# Patient Record
Sex: MALE | Race: BLACK OR AFRICAN AMERICAN | NOT HISPANIC OR LATINO | RURAL
[De-identification: names, ages, dates, MRNs, and addresses within clinical notes are randomized per-mention and may not be internally consistent; named-entity substitution may affect disease eponyms.]

---

## 2021-05-20 ENCOUNTER — CLINICAL SUPPORT (OUTPATIENT)
Dept: PRIMARY CARE CLINIC | Facility: CLINIC | Age: 61
End: 2021-05-20
Payer: MEDICAID

## 2021-05-20 VITALS
WEIGHT: 172 LBS | TEMPERATURE: 98 F | HEIGHT: 69 IN | RESPIRATION RATE: 17 BRPM | OXYGEN SATURATION: 98 % | DIASTOLIC BLOOD PRESSURE: 62 MMHG | HEART RATE: 82 BPM | SYSTOLIC BLOOD PRESSURE: 120 MMHG | BODY MASS INDEX: 25.48 KG/M2

## 2021-05-20 DIAGNOSIS — F20.9 SCHIZOPHRENIA, UNSPECIFIED TYPE: Primary | ICD-10-CM

## 2021-05-20 PROCEDURE — 99213 PR OFFICE/OUTPT VISIT, EST, LEVL III, 20-29 MIN: ICD-10-PCS | Mod: ,,, | Performed by: FAMILY MEDICINE

## 2021-05-20 PROCEDURE — 99213 OFFICE O/P EST LOW 20 MIN: CPT | Mod: ,,, | Performed by: FAMILY MEDICINE

## 2021-05-20 RX ORDER — HYDROXYZINE HYDROCHLORIDE 50 MG/1
50 TABLET, FILM COATED ORAL EVERY 8 HOURS
Qty: 30 TABLET | Refills: 11 | Status: SHIPPED | OUTPATIENT
Start: 2021-05-20 | End: 2022-03-10 | Stop reason: SDUPTHER

## 2021-05-20 RX ORDER — RISPERIDONE 2 MG/1
2 TABLET ORAL 2 TIMES DAILY
Qty: 60 TABLET | Refills: 11 | Status: SHIPPED | OUTPATIENT
Start: 2021-05-20 | End: 2022-03-10 | Stop reason: SDUPTHER

## 2021-05-20 RX ORDER — TRAZODONE HYDROCHLORIDE 50 MG/1
50 TABLET ORAL NIGHTLY
Qty: 30 TABLET | Refills: 11 | Status: SHIPPED | OUTPATIENT
Start: 2021-05-20 | End: 2022-03-10 | Stop reason: SDUPTHER

## 2021-05-20 RX ORDER — NITROGLYCERIN 0.4 MG/1
0.4 TABLET SUBLINGUAL EVERY 5 MIN PRN
COMMUNITY
End: 2021-05-20 | Stop reason: SDUPTHER

## 2021-05-20 RX ORDER — HYDROXYZINE HYDROCHLORIDE 50 MG/1
50 TABLET, FILM COATED ORAL EVERY 8 HOURS
COMMUNITY
End: 2021-05-20 | Stop reason: SDUPTHER

## 2021-05-20 RX ORDER — TRAZODONE HYDROCHLORIDE 50 MG/1
TABLET ORAL
COMMUNITY
Start: 2021-02-02 | End: 2021-05-20 | Stop reason: SDUPTHER

## 2021-05-20 RX ORDER — PALIPERIDONE PALMITATE 234 MG/1.5ML
234 INJECTION INTRAMUSCULAR ONCE
Qty: 1.5 ML | Refills: 5 | Status: SHIPPED | OUTPATIENT
Start: 2021-05-20 | End: 2022-03-10

## 2021-05-20 RX ORDER — DIVALPROEX SODIUM 500 MG/1
TABLET, FILM COATED, EXTENDED RELEASE ORAL
COMMUNITY
Start: 2021-04-21 | End: 2021-05-20 | Stop reason: SDUPTHER

## 2021-05-20 RX ORDER — BENZTROPINE MESYLATE 1 MG/1
1 TABLET ORAL 2 TIMES DAILY
Qty: 60 TABLET | Refills: 11 | Status: SHIPPED | OUTPATIENT
Start: 2021-05-20 | End: 2022-10-10 | Stop reason: SDUPTHER

## 2021-05-20 RX ORDER — NITROGLYCERIN 0.4 MG/1
0.4 TABLET SUBLINGUAL EVERY 5 MIN PRN
Qty: 25 TABLET | Refills: 5 | Status: SHIPPED | OUTPATIENT
Start: 2021-05-20 | End: 2022-10-10 | Stop reason: ALTCHOICE

## 2021-05-20 RX ORDER — POTASSIUM CHLORIDE 750 MG/1
10 CAPSULE, EXTENDED RELEASE ORAL ONCE
COMMUNITY
End: 2021-05-20 | Stop reason: SDUPTHER

## 2021-05-20 RX ORDER — BENZTROPINE MESYLATE 1 MG/1
TABLET ORAL
COMMUNITY
Start: 2021-04-21 | End: 2021-05-20 | Stop reason: SDUPTHER

## 2021-05-20 RX ORDER — ASPIRIN 81 MG/1
81 TABLET ORAL DAILY
Qty: 30 TABLET | Refills: 11 | Status: SHIPPED | OUTPATIENT
Start: 2021-05-20 | End: 2022-03-09 | Stop reason: SDUPTHER

## 2021-05-20 RX ORDER — PALIPERIDONE PALMITATE 234 MG/1.5ML
INJECTION INTRAMUSCULAR
COMMUNITY
Start: 2021-05-19 | End: 2021-05-20 | Stop reason: SDUPTHER

## 2021-05-20 RX ORDER — RISPERIDONE 2 MG/1
2 TABLET ORAL 2 TIMES DAILY
COMMUNITY
End: 2021-05-20 | Stop reason: SDUPTHER

## 2021-05-20 RX ORDER — DIVALPROEX SODIUM 500 MG/1
500 TABLET, FILM COATED, EXTENDED RELEASE ORAL NIGHTLY
Qty: 30 TABLET | Refills: 11 | Status: SHIPPED | OUTPATIENT
Start: 2021-05-20 | End: 2022-10-10 | Stop reason: SDUPTHER

## 2021-05-20 RX ORDER — POTASSIUM CHLORIDE 750 MG/1
10 CAPSULE, EXTENDED RELEASE ORAL ONCE
Qty: 30 CAPSULE | Refills: 11 | Status: SHIPPED | OUTPATIENT
Start: 2021-05-20 | End: 2021-05-20

## 2021-05-20 RX ORDER — ASPIRIN 81 MG/1
81 TABLET ORAL DAILY
COMMUNITY
End: 2021-05-20 | Stop reason: SDUPTHER

## 2021-06-03 RX ORDER — PALIPERIDONE PALMITATE 234 MG/1.5ML
234 INJECTION INTRAMUSCULAR ONCE
Qty: 1.5 ML | Refills: 0 | OUTPATIENT
Start: 2021-06-03 | End: 2021-06-03

## 2021-06-28 ENCOUNTER — CLINICAL SUPPORT (OUTPATIENT)
Dept: PRIMARY CARE CLINIC | Facility: CLINIC | Age: 61
End: 2021-06-28
Payer: MEDICAID

## 2021-06-28 DIAGNOSIS — F20.9 SCHIZOPHRENIA, UNSPECIFIED TYPE: Primary | ICD-10-CM

## 2022-03-09 ENCOUNTER — APPOINTMENT (OUTPATIENT)
Dept: RADIOLOGY | Facility: CLINIC | Age: 62
End: 2022-03-09
Attending: NURSE PRACTITIONER
Payer: MEDICAID

## 2022-03-09 ENCOUNTER — OFFICE VISIT (OUTPATIENT)
Dept: PRIMARY CARE CLINIC | Facility: CLINIC | Age: 62
End: 2022-03-09
Payer: MEDICAID

## 2022-03-09 VITALS
OXYGEN SATURATION: 96 % | BODY MASS INDEX: 23.99 KG/M2 | HEIGHT: 69 IN | TEMPERATURE: 98 F | RESPIRATION RATE: 18 BRPM | DIASTOLIC BLOOD PRESSURE: 78 MMHG | HEART RATE: 99 BPM | WEIGHT: 162 LBS | SYSTOLIC BLOOD PRESSURE: 120 MMHG

## 2022-03-09 DIAGNOSIS — R10.9 ABDOMINAL PAIN, UNSPECIFIED ABDOMINAL LOCATION: ICD-10-CM

## 2022-03-09 DIAGNOSIS — I25.10 CARDIOVASCULAR DISEASE: ICD-10-CM

## 2022-03-09 DIAGNOSIS — F20.9 SCHIZOPHRENIA, UNSPECIFIED TYPE: ICD-10-CM

## 2022-03-09 DIAGNOSIS — R10.9 ABDOMINAL PAIN, UNSPECIFIED ABDOMINAL LOCATION: Primary | ICD-10-CM

## 2022-03-09 PROCEDURE — 3078F DIAST BP <80 MM HG: CPT | Mod: ,,, | Performed by: NURSE PRACTITIONER

## 2022-03-09 PROCEDURE — 99213 OFFICE O/P EST LOW 20 MIN: CPT | Mod: ,,, | Performed by: NURSE PRACTITIONER

## 2022-03-09 PROCEDURE — 74018 RADEX ABDOMEN 1 VIEW: CPT | Mod: TC,RHCUB | Performed by: NURSE PRACTITIONER

## 2022-03-09 PROCEDURE — 3074F PR MOST RECENT SYSTOLIC BLOOD PRESSURE < 130 MM HG: ICD-10-PCS | Mod: ,,, | Performed by: NURSE PRACTITIONER

## 2022-03-09 PROCEDURE — 3074F SYST BP LT 130 MM HG: CPT | Mod: ,,, | Performed by: NURSE PRACTITIONER

## 2022-03-09 PROCEDURE — 74018 XR KUB: ICD-10-PCS | Mod: 26,,, | Performed by: RADIOLOGY

## 2022-03-09 PROCEDURE — 3078F PR MOST RECENT DIASTOLIC BLOOD PRESSURE < 80 MM HG: ICD-10-PCS | Mod: ,,, | Performed by: NURSE PRACTITIONER

## 2022-03-09 PROCEDURE — 99213 PR OFFICE/OUTPT VISIT, EST, LEVL III, 20-29 MIN: ICD-10-PCS | Mod: ,,, | Performed by: NURSE PRACTITIONER

## 2022-03-09 PROCEDURE — 74018 RADEX ABDOMEN 1 VIEW: CPT | Mod: 26,,, | Performed by: RADIOLOGY

## 2022-03-09 PROCEDURE — 3008F PR BODY MASS INDEX (BMI) DOCUMENTED: ICD-10-PCS | Mod: ,,, | Performed by: NURSE PRACTITIONER

## 2022-03-09 PROCEDURE — 3008F BODY MASS INDEX DOCD: CPT | Mod: ,,, | Performed by: NURSE PRACTITIONER

## 2022-03-09 RX ORDER — ASPIRIN 81 MG/1
81 TABLET ORAL DAILY
Qty: 90 TABLET | Refills: 2 | Status: SHIPPED | OUTPATIENT
Start: 2022-03-09 | End: 2022-10-10 | Stop reason: ALTCHOICE

## 2022-03-09 RX ORDER — DIVALPROEX SODIUM 500 MG/1
500 TABLET, FILM COATED, EXTENDED RELEASE ORAL NIGHTLY
Qty: 30 TABLET | Refills: 11 | Status: CANCELLED | OUTPATIENT
Start: 2022-03-09

## 2022-03-09 RX ORDER — RISPERIDONE 2 MG/1
2 TABLET ORAL 2 TIMES DAILY
Qty: 60 TABLET | Refills: 11 | Status: CANCELLED | OUTPATIENT
Start: 2022-03-09

## 2022-03-09 RX ORDER — TRAZODONE HYDROCHLORIDE 50 MG/1
50 TABLET ORAL NIGHTLY
Qty: 30 TABLET | Refills: 11 | Status: CANCELLED | OUTPATIENT
Start: 2022-03-09

## 2022-03-09 RX ORDER — HYDROXYZINE HYDROCHLORIDE 50 MG/1
50 TABLET, FILM COATED ORAL EVERY 8 HOURS
Qty: 30 TABLET | Refills: 11 | Status: CANCELLED | OUTPATIENT
Start: 2022-03-09
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
## 2022-03-10 ENCOUNTER — TELEPHONE (OUTPATIENT)
Dept: PRIMARY CARE CLINIC | Facility: CLINIC | Age: 62
End: 2022-03-10
Payer: MEDICAID

## 2022-03-10 DIAGNOSIS — F20.9 SCHIZOPHRENIA, UNSPECIFIED TYPE: ICD-10-CM

## 2022-03-10 RX ORDER — HYDROXYZINE HYDROCHLORIDE 50 MG/1
50 TABLET, FILM COATED ORAL EVERY 8 HOURS
Qty: 30 TABLET | Refills: 5 | Status: SHIPPED | OUTPATIENT
Start: 2022-03-10 | End: 2022-10-10 | Stop reason: SDUPTHER

## 2022-03-10 RX ORDER — RISPERIDONE 2 MG/1
2 TABLET ORAL 2 TIMES DAILY
Qty: 60 TABLET | Refills: 5 | Status: SHIPPED | OUTPATIENT
Start: 2022-03-10 | End: 2022-10-10

## 2022-03-10 RX ORDER — TRAZODONE HYDROCHLORIDE 50 MG/1
50 TABLET ORAL NIGHTLY
Qty: 30 TABLET | Refills: 5 | Status: SHIPPED | OUTPATIENT
Start: 2022-03-10 | End: 2022-10-10 | Stop reason: SDUPTHER

## 2022-03-10 NOTE — TELEPHONE ENCOUNTER
----- Message from Rudy Escamilla DNP, ANN-C sent at 3/9/2022  5:07 PM CST -----  Please notify patient/his guardian of results

## 2022-05-20 ENCOUNTER — HOSPITAL ENCOUNTER (EMERGENCY)
Facility: HOSPITAL | Age: 62
Discharge: HOME | End: 2022-05-20
Attending: SPECIALIST
Payer: MEDICAID

## 2022-05-20 VITALS
OXYGEN SATURATION: 100 % | SYSTOLIC BLOOD PRESSURE: 138 MMHG | DIASTOLIC BLOOD PRESSURE: 75 MMHG | HEART RATE: 91 BPM | HEIGHT: 69 IN | WEIGHT: 150.63 LBS | BODY MASS INDEX: 22.31 KG/M2 | RESPIRATION RATE: 16 BRPM | TEMPERATURE: 99 F

## 2022-05-20 DIAGNOSIS — R07.9 CHEST PAIN: ICD-10-CM

## 2022-05-20 DIAGNOSIS — R07.9 CHEST PAIN, UNSPECIFIED TYPE: Primary | ICD-10-CM

## 2022-05-20 PROCEDURE — 25000003 PHARM REV CODE 250: Performed by: SPECIALIST

## 2022-05-20 PROCEDURE — 93005 ELECTROCARDIOGRAM TRACING: CPT

## 2022-05-20 PROCEDURE — 99283 PR EMERGENCY DEPT VISIT,LEVEL III: ICD-10-PCS | Mod: ,,, | Performed by: SPECIALIST

## 2022-05-20 PROCEDURE — 93010 EKG 12-LEAD: ICD-10-PCS | Mod: ,,, | Performed by: INTERNAL MEDICINE

## 2022-05-20 PROCEDURE — 99283 EMERGENCY DEPT VISIT LOW MDM: CPT | Mod: 25 | Performed by: SPECIALIST

## 2022-05-20 PROCEDURE — 93010 ELECTROCARDIOGRAM REPORT: CPT | Mod: ,,, | Performed by: INTERNAL MEDICINE

## 2022-05-20 PROCEDURE — 99283 EMERGENCY DEPT VISIT LOW MDM: CPT | Mod: ,,, | Performed by: SPECIALIST

## 2022-05-20 RX ORDER — ASPIRIN 325 MG
325 TABLET ORAL
Status: COMPLETED | OUTPATIENT
Start: 2022-05-20 | End: 2022-05-20

## 2022-05-20 RX ADMIN — ASPIRIN 325 MG ORAL TABLET 325 MG: 325 PILL ORAL at 08:05

## 2022-05-20 NOTE — ED NOTES
ATTEMPTED IV ACCESS X 2 WITH VEIN BLOWING EACH TIME; LAB ATTEMPTED X 1 WITH NO SUCCESS WITH PATIENT REFUSING ANY MORE ATTEMPTS & STATES HE JUST WANTS TO LEAVE
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
## 2022-05-20 NOTE — ED TRIAGE NOTES
PATIENT STATES HE HAS NOT TAKEN ANY OF HIS MEDICATIONS & DOES NOT KNOW WHEN THE LAST TIME HE HAS TAKEN THEM; PATIENT IS POOR HISTORIAN

## 2022-05-20 NOTE — LETTER
Patient: Dion Flores  YOB: 1960  Date: 5/20/2022 Time: 9:04 AM  Location: [facility] Emergency Department    Leaving the Hospital Against Medical Advice    Chart #:[id number]    This will certify that I, the undersigned,    ______________________________________________________________________    A patient in the above named medical center, having requested discharge and removal from the medical [facility] against the advice of my attending physician(s), hereby release [facility], its physicians, officers and employees, severally and individually, from any and all liability of any nature whatsoever for any injury or harm or complication of any kind that may result directly or indirectly, by reason of my terminating my stay as a patient at [facility] Emergency Department and my departure from [facility], and hereby waive any and all rights of action I may now have or later acquire as a result of my voluntary departure from [facility] and the termination of my stay as a patient therein.    This release is made with the full knowledge of the danger that may result from the action which I am taking.      Date:_______________________                         ___________________________                                                                                    Patient/Legal Representative    Witness:        ____________________________                          ___________________________  Nurse                                                                        Physician

## 2022-06-10 RX ORDER — PALIPERIDONE 3 MG/1
3 TABLET, EXTENDED RELEASE ORAL NIGHTLY
COMMUNITY
Start: 2022-04-21 | End: 2022-07-12 | Stop reason: SDUPTHER

## 2022-06-10 RX ORDER — PALIPERIDONE PALMITATE 234 MG/1.5ML
INJECTION INTRAMUSCULAR
COMMUNITY
Start: 2022-04-21 | End: 2022-07-12 | Stop reason: SDUPTHER

## 2022-06-10 RX ORDER — BENZTROPINE MESYLATE 2 MG/1
2 TABLET ORAL 2 TIMES DAILY
COMMUNITY
Start: 2022-04-21 | End: 2023-07-18

## 2022-07-12 DIAGNOSIS — F20.9 SCHIZOPHRENIA, UNSPECIFIED TYPE: Primary | ICD-10-CM

## 2022-07-12 RX ORDER — PALIPERIDONE 3 MG/1
3 TABLET, EXTENDED RELEASE ORAL NIGHTLY
Qty: 30 TABLET | Refills: 5 | Status: SHIPPED | OUTPATIENT
Start: 2022-07-12 | End: 2022-10-10 | Stop reason: SDUPTHER

## 2022-07-12 RX ORDER — PALIPERIDONE PALMITATE 234 MG/1.5ML
234 INJECTION INTRAMUSCULAR ONCE
Qty: 1.5 ML | Refills: 2 | Status: SHIPPED | OUTPATIENT
Start: 2022-07-12 | End: 2022-10-10 | Stop reason: ALTCHOICE

## 2022-10-10 ENCOUNTER — OFFICE VISIT (OUTPATIENT)
Dept: PRIMARY CARE CLINIC | Facility: CLINIC | Age: 62
End: 2022-10-10
Payer: MEDICAID

## 2022-10-10 VITALS
TEMPERATURE: 99 F | DIASTOLIC BLOOD PRESSURE: 60 MMHG | BODY MASS INDEX: 25.62 KG/M2 | WEIGHT: 173 LBS | HEIGHT: 69 IN | RESPIRATION RATE: 18 BRPM | SYSTOLIC BLOOD PRESSURE: 122 MMHG | HEART RATE: 91 BPM | OXYGEN SATURATION: 96 %

## 2022-10-10 DIAGNOSIS — Z79.899 LONG TERM USE OF DRUG: ICD-10-CM

## 2022-10-10 DIAGNOSIS — F20.9 SCHIZOPHRENIA, UNSPECIFIED TYPE: Primary | ICD-10-CM

## 2022-10-10 LAB
ALBUMIN SERPL BCP-MCNC: 3.7 G/DL (ref 3.5–5)
ALBUMIN/GLOB SERPL: 0.9 {RATIO}
ALP SERPL-CCNC: 70 U/L (ref 45–115)
ALT SERPL W P-5'-P-CCNC: 27 U/L (ref 16–61)
ANION GAP SERPL CALCULATED.3IONS-SCNC: 9 MMOL/L (ref 7–16)
AST SERPL W P-5'-P-CCNC: 16 U/L (ref 15–37)
BASOPHILS # BLD AUTO: 0.02 K/UL (ref 0–0.2)
BASOPHILS NFR BLD AUTO: 0.5 % (ref 0–1)
BILIRUB SERPL-MCNC: 0.4 MG/DL (ref ?–1.2)
BUN SERPL-MCNC: 11 MG/DL (ref 7–18)
BUN/CREAT SERPL: 9 (ref 6–20)
CALCIUM SERPL-MCNC: 9.2 MG/DL (ref 8.5–10.1)
CHLORIDE SERPL-SCNC: 106 MMOL/L (ref 98–107)
CHOLEST SERPL-MCNC: 117 MG/DL (ref 0–200)
CHOLEST/HDLC SERPL: 2.4 {RATIO}
CO2 SERPL-SCNC: 27 MMOL/L (ref 21–32)
CREAT SERPL-MCNC: 1.19 MG/DL (ref 0.7–1.3)
DIFFERENTIAL METHOD BLD: ABNORMAL
EGFR (NO RACE VARIABLE) (RUSH/TITUS): 69 ML/MIN/1.73M²
EOSINOPHIL # BLD AUTO: 0.03 K/UL (ref 0–0.5)
EOSINOPHIL NFR BLD AUTO: 0.8 % (ref 1–4)
ERYTHROCYTE [DISTWIDTH] IN BLOOD BY AUTOMATED COUNT: 12 % (ref 11.5–14.5)
GLOBULIN SER-MCNC: 4.1 G/DL (ref 2–4)
GLUCOSE SERPL-MCNC: 95 MG/DL (ref 74–106)
HCT VFR BLD AUTO: 39.3 % (ref 40–54)
HDLC SERPL-MCNC: 49 MG/DL (ref 40–60)
HGB BLD-MCNC: 13.4 G/DL (ref 13.5–18)
IMM GRANULOCYTES # BLD AUTO: 0.01 K/UL (ref 0–0.04)
IMM GRANULOCYTES NFR BLD: 0.3 % (ref 0–0.4)
LDLC SERPL CALC-MCNC: 49 MG/DL
LDLC/HDLC SERPL: 1 {RATIO}
LYMPHOCYTES # BLD AUTO: 1.1 K/UL (ref 1–4.8)
LYMPHOCYTES NFR BLD AUTO: 29.3 % (ref 27–41)
MCH RBC QN AUTO: 33 PG (ref 27–31)
MCHC RBC AUTO-ENTMCNC: 34.1 G/DL (ref 32–36)
MCV RBC AUTO: 96.8 FL (ref 80–96)
MONOCYTES # BLD AUTO: 0.38 K/UL (ref 0–0.8)
MONOCYTES NFR BLD AUTO: 10.1 % (ref 2–6)
MPC BLD CALC-MCNC: 11.3 FL (ref 9.4–12.4)
NEUTROPHILS # BLD AUTO: 2.22 K/UL (ref 1.8–7.7)
NEUTROPHILS NFR BLD AUTO: 59 % (ref 53–65)
NONHDLC SERPL-MCNC: 68 MG/DL
NRBC # BLD AUTO: 0 X10E3/UL
NRBC, AUTO (.00): 0 %
PLATELET # BLD AUTO: 204 K/UL (ref 150–400)
POTASSIUM SERPL-SCNC: 3.5 MMOL/L (ref 3.5–5.1)
PROT SERPL-MCNC: 7.8 G/DL (ref 6.4–8.2)
RBC # BLD AUTO: 4.06 M/UL (ref 4.6–6.2)
SODIUM SERPL-SCNC: 138 MMOL/L (ref 136–145)
TRIGL SERPL-MCNC: 96 MG/DL (ref 35–150)
VLDLC SERPL-MCNC: 19 MG/DL
WBC # BLD AUTO: 3.76 K/UL (ref 4.5–11)

## 2022-10-10 PROCEDURE — 3008F BODY MASS INDEX DOCD: CPT | Mod: ,,, | Performed by: FAMILY MEDICINE

## 2022-10-10 PROCEDURE — 3078F PR MOST RECENT DIASTOLIC BLOOD PRESSURE < 80 MM HG: ICD-10-PCS | Mod: ,,, | Performed by: FAMILY MEDICINE

## 2022-10-10 PROCEDURE — 80061 LIPID PANEL: CPT | Mod: ,,, | Performed by: CLINICAL MEDICAL LABORATORY

## 2022-10-10 PROCEDURE — 80053 COMPREHENSIVE METABOLIC PANEL: ICD-10-PCS | Mod: ,,, | Performed by: CLINICAL MEDICAL LABORATORY

## 2022-10-10 PROCEDURE — 3078F DIAST BP <80 MM HG: CPT | Mod: ,,, | Performed by: FAMILY MEDICINE

## 2022-10-10 PROCEDURE — 80053 COMPREHEN METABOLIC PANEL: CPT | Mod: ,,, | Performed by: CLINICAL MEDICAL LABORATORY

## 2022-10-10 PROCEDURE — 85025 CBC WITH DIFFERENTIAL: ICD-10-PCS | Mod: ,,, | Performed by: CLINICAL MEDICAL LABORATORY

## 2022-10-10 PROCEDURE — 99214 OFFICE O/P EST MOD 30 MIN: CPT | Mod: ,,, | Performed by: FAMILY MEDICINE

## 2022-10-10 PROCEDURE — 99214 PR OFFICE/OUTPT VISIT, EST, LEVL IV, 30-39 MIN: ICD-10-PCS | Mod: ,,, | Performed by: FAMILY MEDICINE

## 2022-10-10 PROCEDURE — 3074F PR MOST RECENT SYSTOLIC BLOOD PRESSURE < 130 MM HG: ICD-10-PCS | Mod: ,,, | Performed by: FAMILY MEDICINE

## 2022-10-10 PROCEDURE — 3008F PR BODY MASS INDEX (BMI) DOCUMENTED: ICD-10-PCS | Mod: ,,, | Performed by: FAMILY MEDICINE

## 2022-10-10 PROCEDURE — 85025 COMPLETE CBC W/AUTO DIFF WBC: CPT | Mod: ,,, | Performed by: CLINICAL MEDICAL LABORATORY

## 2022-10-10 PROCEDURE — 3074F SYST BP LT 130 MM HG: CPT | Mod: ,,, | Performed by: FAMILY MEDICINE

## 2022-10-10 PROCEDURE — 80061 LIPID PANEL: ICD-10-PCS | Mod: ,,, | Performed by: CLINICAL MEDICAL LABORATORY

## 2022-10-10 RX ORDER — DIVALPROEX SODIUM 500 MG/1
500 TABLET, FILM COATED, EXTENDED RELEASE ORAL NIGHTLY
Qty: 30 TABLET | Refills: 11 | Status: SHIPPED | OUTPATIENT
Start: 2022-10-10 | End: 2023-05-12 | Stop reason: CLARIF

## 2022-10-10 RX ORDER — PALIPERIDONE 3 MG/1
3 TABLET, EXTENDED RELEASE ORAL NIGHTLY
Qty: 30 TABLET | Refills: 5 | Status: SHIPPED | OUTPATIENT
Start: 2022-10-10 | End: 2022-10-17

## 2022-10-10 RX ORDER — TRAZODONE HYDROCHLORIDE 50 MG/1
50 TABLET ORAL NIGHTLY
Qty: 30 TABLET | Refills: 5 | Status: SHIPPED | OUTPATIENT
Start: 2022-10-10 | End: 2023-07-18 | Stop reason: SDUPTHER

## 2022-10-10 RX ORDER — BENZTROPINE MESYLATE 1 MG/1
1 TABLET ORAL 2 TIMES DAILY
Qty: 60 TABLET | Refills: 11 | Status: SHIPPED | OUTPATIENT
Start: 2022-10-10 | End: 2022-10-17 | Stop reason: DRUGHIGH

## 2022-10-10 RX ORDER — HYDROXYZINE HYDROCHLORIDE 50 MG/1
50 TABLET, FILM COATED ORAL EVERY 8 HOURS
Qty: 30 TABLET | Refills: 5 | Status: SHIPPED | OUTPATIENT
Start: 2022-10-10 | End: 2022-10-17
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
## 2022-10-17 ENCOUNTER — OFFICE VISIT (OUTPATIENT)
Dept: PRIMARY CARE CLINIC | Facility: CLINIC | Age: 62
End: 2022-10-17
Payer: MEDICAID

## 2022-10-17 VITALS
DIASTOLIC BLOOD PRESSURE: 82 MMHG | HEIGHT: 69 IN | RESPIRATION RATE: 20 BRPM | HEART RATE: 96 BPM | SYSTOLIC BLOOD PRESSURE: 138 MMHG | OXYGEN SATURATION: 100 % | TEMPERATURE: 99 F | BODY MASS INDEX: 25.62 KG/M2 | WEIGHT: 173 LBS

## 2022-10-17 DIAGNOSIS — F20.0 PARANOID SCHIZOPHRENIA: Primary | ICD-10-CM

## 2022-10-17 PROCEDURE — 99212 PR OFFICE/OUTPT VISIT, EST, LEVL II, 10-19 MIN: ICD-10-PCS | Mod: 25,,, | Performed by: FAMILY MEDICINE

## 2022-10-17 PROCEDURE — 90686 FLU VACCINE (QUAD) GREATER THAN OR EQUAL TO 3YO PRESERVATIVE FREE IM: ICD-10-PCS | Mod: ,,, | Performed by: FAMILY MEDICINE

## 2022-10-17 PROCEDURE — 99212 OFFICE O/P EST SF 10 MIN: CPT | Mod: 25,,, | Performed by: FAMILY MEDICINE

## 2022-10-17 PROCEDURE — 90686 IIV4 VACC NO PRSV 0.5 ML IM: CPT | Mod: ,,, | Performed by: FAMILY MEDICINE

## 2022-10-17 NOTE — PROGRESS NOTES
Subjective:       Patient ID: Dion Flores is a 62 y.o. male.    Chief Complaint: Follow-up (1 week patient states he feels like himself) and Schizophrenia    Requesting a narcotic cough syrup. He can't have this. Now agitated. Meds to be regulated by psychiatrist.    Follow-up  Pertinent negatives include no arthralgias, chest pain, coughing, fatigue, fever, headaches, myalgias, nausea or vomiting.   Review of Systems   Constitutional:  Negative for fatigue and fever.   HENT:  Negative for dental problem.    Eyes:  Negative for discharge.   Respiratory:  Negative for cough, choking, chest tightness and shortness of breath.    Cardiovascular:  Negative for chest pain and leg swelling.   Gastrointestinal:  Negative for constipation, diarrhea, nausea and vomiting.   Genitourinary:  Negative for discharge and flank pain.   Musculoskeletal:  Negative for arthralgias and myalgias.   Allergic/Immunologic: Negative for environmental allergies.   Neurological:  Negative for headaches and memory loss.   Psychiatric/Behavioral:  Negative for behavioral problems and hallucinations.        Objective:      Physical Exam  Vitals and nursing note reviewed.   Constitutional:       Appearance: Normal appearance. He is normal weight.   HENT:      Head: Normocephalic and atraumatic.      Right Ear: Tympanic membrane normal.      Left Ear: Tympanic membrane normal.      Nose: Nose normal.      Mouth/Throat:      Mouth: Mucous membranes are moist.   Eyes:      Extraocular Movements: Extraocular movements intact.      Conjunctiva/sclera: Conjunctivae normal.      Pupils: Pupils are equal, round, and reactive to light.   Cardiovascular:      Rate and Rhythm: Normal rate and regular rhythm.      Pulses: Normal pulses.   Pulmonary:      Effort: Pulmonary effort is normal.      Breath sounds: Normal breath sounds.   Abdominal:      General: Abdomen is flat. Bowel sounds are normal.      Palpations: Abdomen is soft.   Musculoskeletal:          General: Normal range of motion.      Cervical back: Normal range of motion and neck supple.   Skin:     General: Skin is warm and dry.   Neurological:      General: No focal deficit present.      Mental Status: He is alert and oriented to person, place, and time.   Psychiatric:         Mood and Affect: Mood normal.       Assessment:       Problem List Items Addressed This Visit          Psychiatric    Paranoid schizophrenia - Primary    Relevant Orders    Influenza - Quadrivalent *Preferred* (6 months+) (PF)       Plan:       Needs to see psychiatrist soon

## 2023-01-22 ENCOUNTER — HOSPITAL ENCOUNTER (EMERGENCY)
Facility: HOSPITAL | Age: 63
Discharge: HOME | End: 2023-01-22
Attending: PEDIATRICS
Payer: MEDICAID

## 2023-01-22 VITALS
WEIGHT: 182.13 LBS | HEART RATE: 85 BPM | HEIGHT: 70 IN | SYSTOLIC BLOOD PRESSURE: 129 MMHG | RESPIRATION RATE: 18 BRPM | OXYGEN SATURATION: 100 % | TEMPERATURE: 98 F | BODY MASS INDEX: 26.07 KG/M2 | DIASTOLIC BLOOD PRESSURE: 73 MMHG

## 2023-01-22 DIAGNOSIS — J06.9 VIRAL URI WITH COUGH: Primary | ICD-10-CM

## 2023-01-22 PROCEDURE — 99283 PR EMERGENCY DEPT VISIT,LEVEL III: ICD-10-PCS | Mod: ,,, | Performed by: PEDIATRICS

## 2023-01-22 PROCEDURE — 99283 EMERGENCY DEPT VISIT LOW MDM: CPT | Mod: ,,, | Performed by: PEDIATRICS

## 2023-01-22 PROCEDURE — 99283 EMERGENCY DEPT VISIT LOW MDM: CPT

## 2023-01-22 RX ORDER — RISPERIDONE 2 MG/1
2 TABLET ORAL NIGHTLY
COMMUNITY
Start: 2023-01-03 | End: 2023-07-18 | Stop reason: SDUPTHER

## 2023-01-22 RX ORDER — BENZONATATE 100 MG/1
100 CAPSULE ORAL 3 TIMES DAILY PRN
Qty: 20 CAPSULE | Refills: 0 | Status: SHIPPED | OUTPATIENT
Start: 2023-01-22 | End: 2023-02-01
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
## 2023-01-22 NOTE — ED TRIAGE NOTES
C/o cough x 3 days. Denies sputum production. Pt states that he has no home and has been staying with different family members. States he has been around a bunch of people but doesn't know if they were sick.

## 2023-05-12 ENCOUNTER — HOSPITAL ENCOUNTER (EMERGENCY)
Facility: HOSPITAL | Age: 63
Discharge: ANOTHER HOSPITAL | End: 2023-05-12
Attending: SPECIALIST
Payer: MEDICAID

## 2023-05-12 VITALS
OXYGEN SATURATION: 98 % | SYSTOLIC BLOOD PRESSURE: 128 MMHG | BODY MASS INDEX: 23.79 KG/M2 | HEART RATE: 77 BPM | HEIGHT: 69 IN | WEIGHT: 160.63 LBS | DIASTOLIC BLOOD PRESSURE: 49 MMHG | TEMPERATURE: 98 F | RESPIRATION RATE: 18 BRPM

## 2023-05-12 DIAGNOSIS — R45.851 SUICIDAL IDEATIONS: Primary | ICD-10-CM

## 2023-05-12 DIAGNOSIS — R05.9 COUGH: ICD-10-CM

## 2023-05-12 DIAGNOSIS — Z00.8 MEDICAL CLEARANCE FOR PSYCHIATRIC ADMISSION: ICD-10-CM

## 2023-05-12 DIAGNOSIS — F20.0 PARANOID SCHIZOPHRENIA: ICD-10-CM

## 2023-05-12 DIAGNOSIS — R45.89 SUICIDAL BEHAVIOR: ICD-10-CM

## 2023-05-12 LAB
ALBUMIN SERPL BCP-MCNC: 3.5 G/DL (ref 3.5–5)
ALBUMIN/GLOB SERPL: 1 {RATIO}
ALP SERPL-CCNC: 77 U/L (ref 45–115)
ALT SERPL W P-5'-P-CCNC: 9 U/L (ref 16–61)
AMPHET UR QL SCN: POSITIVE
ANION GAP SERPL CALCULATED.3IONS-SCNC: 11 MMOL/L (ref 7–16)
AST SERPL W P-5'-P-CCNC: 30 U/L (ref 15–37)
BARBITURATES UR QL SCN: NEGATIVE
BASOPHILS # BLD AUTO: 0.01 K/UL (ref 0–0.2)
BASOPHILS NFR BLD AUTO: 0.2 % (ref 0–1)
BENZODIAZ METAB UR QL SCN: NEGATIVE
BILIRUB SERPL-MCNC: 0.4 MG/DL (ref ?–1.2)
BILIRUB UR QL STRIP: NEGATIVE
BUN SERPL-MCNC: 9 MG/DL (ref 7–18)
BUN/CREAT SERPL: 8 (ref 6–20)
CALCIUM SERPL-MCNC: 8.9 MG/DL (ref 8.5–10.1)
CANNABINOIDS UR QL SCN: POSITIVE
CHLORIDE SERPL-SCNC: 103 MMOL/L (ref 98–107)
CLARITY UR: CLEAR
CO2 SERPL-SCNC: 30 MMOL/L (ref 21–32)
COCAINE UR QL SCN: NEGATIVE
COLOR UR: YELLOW
CREAT SERPL-MCNC: 1.08 MG/DL (ref 0.7–1.3)
DIFFERENTIAL METHOD BLD: ABNORMAL
EGFR (NO RACE VARIABLE) (RUSH/TITUS): 77 ML/MIN/1.73M2
EOSINOPHIL # BLD AUTO: 0.12 K/UL (ref 0–0.5)
EOSINOPHIL NFR BLD AUTO: 2.9 % (ref 1–4)
ERYTHROCYTE [DISTWIDTH] IN BLOOD BY AUTOMATED COUNT: 11.7 % (ref 11.5–14.5)
ETHANOL, BLOOD (CATEGORY): NOT DETECTED
FLUAV AG UPPER RESP QL IA.RAPID: NEGATIVE
FLUBV AG UPPER RESP QL IA.RAPID: NEGATIVE
GLOBULIN SER-MCNC: 3.6 G/DL (ref 2–4)
GLUCOSE SERPL-MCNC: 101 MG/DL (ref 74–106)
GLUCOSE UR STRIP-MCNC: NEGATIVE MG/DL
HCT VFR BLD AUTO: 38 % (ref 40–54)
HGB BLD-MCNC: 12.7 G/DL (ref 13.5–18)
IMM GRANULOCYTES # BLD AUTO: 0.01 K/UL (ref 0–0.04)
IMM GRANULOCYTES NFR BLD: 0.2 % (ref 0–0.4)
KETONES UR STRIP-SCNC: NEGATIVE MG/DL
LACTATE SERPL-SCNC: 0.9 MMOL/L (ref 0.4–2)
LEUKOCYTE ESTERASE UR QL STRIP: NEGATIVE
LYMPHOCYTES # BLD AUTO: 1.04 K/UL (ref 1–4.8)
LYMPHOCYTES NFR BLD AUTO: 24.9 % (ref 27–41)
MAGNESIUM SERPL-MCNC: 2 MG/DL (ref 1.7–2.3)
MCH RBC QN AUTO: 33 PG (ref 27–31)
MCHC RBC AUTO-ENTMCNC: 33.4 G/DL (ref 32–36)
MCV RBC AUTO: 98.7 FL (ref 80–96)
MONOCYTES # BLD AUTO: 0.57 K/UL (ref 0–0.8)
MONOCYTES NFR BLD AUTO: 13.7 % (ref 2–6)
MPC BLD CALC-MCNC: 9.9 FL (ref 9.4–12.4)
NEUTROPHILS # BLD AUTO: 2.42 K/UL (ref 1.8–7.7)
NEUTROPHILS NFR BLD AUTO: 58.1 % (ref 53–65)
NITRITE UR QL STRIP: NEGATIVE
OPIATES UR QL SCN: NEGATIVE
PCP UR QL SCN: NEGATIVE
PH UR STRIP: 7 PH UNITS
PLATELET # BLD AUTO: 234 K/UL (ref 150–400)
POTASSIUM SERPL-SCNC: 3.6 MMOL/L (ref 3.5–5.1)
PROT SERPL-MCNC: 7.1 G/DL (ref 6.4–8.2)
PROT UR QL STRIP: NEGATIVE
RBC # BLD AUTO: 3.85 M/UL (ref 4.6–6.2)
RBC # UR STRIP: NEGATIVE /UL
SARS-COV+SARS-COV-2 AG RESP QL IA.RAPID: NEGATIVE
SODIUM SERPL-SCNC: 140 MMOL/L (ref 136–145)
SP GR UR STRIP: 1.02
TROPONIN I SERPL HS-MCNC: 7.3 PG/ML
UROBILINOGEN UR STRIP-ACNC: 4 MG/DL
WBC # BLD AUTO: 4.17 K/UL (ref 4.5–11)

## 2023-05-12 PROCEDURE — 85025 COMPLETE CBC W/AUTO DIFF WBC: CPT | Performed by: SPECIALIST

## 2023-05-12 PROCEDURE — 82077 ASSAY SPEC XCP UR&BREATH IA: CPT | Performed by: SPECIALIST

## 2023-05-12 PROCEDURE — 83735 ASSAY OF MAGNESIUM: CPT | Performed by: SPECIALIST

## 2023-05-12 PROCEDURE — 84484 ASSAY OF TROPONIN QUANT: CPT | Performed by: SPECIALIST

## 2023-05-12 PROCEDURE — 93005 ELECTROCARDIOGRAM TRACING: CPT

## 2023-05-12 PROCEDURE — 87040 BLOOD CULTURE FOR BACTERIA: CPT | Performed by: SPECIALIST

## 2023-05-12 PROCEDURE — 93010 EKG 12-LEAD: ICD-10-PCS | Mod: ,,, | Performed by: INTERNAL MEDICINE

## 2023-05-12 PROCEDURE — 99285 EMERGENCY DEPT VISIT HI MDM: CPT | Mod: 25

## 2023-05-12 PROCEDURE — 99284 EMERGENCY DEPT VISIT MOD MDM: CPT | Mod: ,,, | Performed by: SPECIALIST

## 2023-05-12 PROCEDURE — 80307 DRUG TEST PRSMV CHEM ANLYZR: CPT | Performed by: SPECIALIST

## 2023-05-12 PROCEDURE — 99284 PR EMERGENCY DEPT VISIT,LEVEL IV: ICD-10-PCS | Mod: ,,, | Performed by: SPECIALIST

## 2023-05-12 PROCEDURE — 87428 SARSCOV & INF VIR A&B AG IA: CPT | Performed by: SPECIALIST

## 2023-05-12 PROCEDURE — 93010 ELECTROCARDIOGRAM REPORT: CPT | Mod: ,,, | Performed by: INTERNAL MEDICINE

## 2023-05-12 PROCEDURE — 83605 ASSAY OF LACTIC ACID: CPT | Performed by: SPECIALIST

## 2023-05-12 PROCEDURE — 81003 URINALYSIS AUTO W/O SCOPE: CPT | Performed by: SPECIALIST

## 2023-05-12 PROCEDURE — 80053 COMPREHEN METABOLIC PANEL: CPT | Performed by: SPECIALIST
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
## 2023-05-13 NOTE — ED NOTES
"Asked pt if he has specific thoughts about harming himself he states "I don't know I might go jump in a hole or something"  "
Call from jose roberto santillan at [facility]. Dr jeans has accepted pt.   
Call placed to [facility]-spoke with nohemi-she states that he is usually admitted to [facility] in [place]  
[facility] called for pt transport to [facility].  
Meal tray served.  
Pt ambulatory to bathroom then back to bed.  
Pt belongings, pt brought a bag of his clothes and has many hanging chains (jewelry), taken out of room and placed at nurses station.  
Pt placed in hospital gown. Pt refused to take his pants off. Belt was removed.  Most of contents of his pockets emptied but not all (pt refused) Dr rice notified.  
Pt resting quietly in bed.  
Pt resting quietly in bed.  
Report called to renetta at [facility]. [facility] here to transport pt.  
Spoke with Clover with [facility] for possible admit to psych unit  
Spoke with pt cousin, anna wick, states that he would be happy to pick him up from anywhere at [place] from facility.  
GOLD

## 2023-05-18 LAB
BACTERIA BLD CULT: NORMAL
BACTERIA BLD CULT: NORMAL

## 2023-07-18 ENCOUNTER — OFFICE VISIT (OUTPATIENT)
Dept: PRIMARY CARE CLINIC | Facility: CLINIC | Age: 63
End: 2023-07-18
Payer: MEDICAID

## 2023-07-18 VITALS
HEART RATE: 100 BPM | HEIGHT: 69 IN | TEMPERATURE: 98 F | DIASTOLIC BLOOD PRESSURE: 62 MMHG | BODY MASS INDEX: 24.29 KG/M2 | RESPIRATION RATE: 20 BRPM | WEIGHT: 164 LBS | SYSTOLIC BLOOD PRESSURE: 90 MMHG | OXYGEN SATURATION: 96 %

## 2023-07-18 DIAGNOSIS — F20.9 SCHIZOPHRENIA, UNSPECIFIED TYPE: ICD-10-CM

## 2023-07-18 DIAGNOSIS — K05.10 GINGIVITIS: Primary | ICD-10-CM

## 2023-07-18 PROCEDURE — 3078F PR MOST RECENT DIASTOLIC BLOOD PRESSURE < 80 MM HG: ICD-10-PCS | Mod: ,,, | Performed by: FAMILY MEDICINE

## 2023-07-18 PROCEDURE — 3008F BODY MASS INDEX DOCD: CPT | Mod: ,,, | Performed by: FAMILY MEDICINE

## 2023-07-18 PROCEDURE — 3008F PR BODY MASS INDEX (BMI) DOCUMENTED: ICD-10-PCS | Mod: ,,, | Performed by: FAMILY MEDICINE

## 2023-07-18 PROCEDURE — 3074F SYST BP LT 130 MM HG: CPT | Mod: ,,, | Performed by: FAMILY MEDICINE

## 2023-07-18 PROCEDURE — 99213 OFFICE O/P EST LOW 20 MIN: CPT | Mod: ,,, | Performed by: FAMILY MEDICINE

## 2023-07-18 PROCEDURE — 3074F PR MOST RECENT SYSTOLIC BLOOD PRESSURE < 130 MM HG: ICD-10-PCS | Mod: ,,, | Performed by: FAMILY MEDICINE

## 2023-07-18 PROCEDURE — 3078F DIAST BP <80 MM HG: CPT | Mod: ,,, | Performed by: FAMILY MEDICINE

## 2023-07-18 PROCEDURE — 99213 PR OFFICE/OUTPT VISIT, EST, LEVL III, 20-29 MIN: ICD-10-PCS | Mod: ,,, | Performed by: FAMILY MEDICINE

## 2023-07-18 RX ORDER — BENZTROPINE MESYLATE 1 MG/1
1 TABLET ORAL 2 TIMES DAILY
COMMUNITY
Start: 2023-06-09 | End: 2023-07-18

## 2023-07-18 RX ORDER — BENZTROPINE MESYLATE 1 MG/1
1 TABLET ORAL 2 TIMES DAILY
Qty: 180 TABLET | Refills: 3 | Status: SHIPPED | OUTPATIENT
Start: 2023-07-18 | End: 2023-10-23 | Stop reason: SDUPTHER

## 2023-07-18 RX ORDER — ASPIRIN 81 MG/1
81 TABLET ORAL
COMMUNITY
Start: 2023-06-09

## 2023-07-18 RX ORDER — LORATADINE 10 MG/1
10 TABLET ORAL
COMMUNITY
Start: 2023-06-09

## 2023-07-18 RX ORDER — RISPERIDONE 2 MG/1
2 TABLET ORAL NIGHTLY
Qty: 90 TABLET | Refills: 3 | Status: SHIPPED | OUTPATIENT
Start: 2023-07-18 | End: 2023-10-23 | Stop reason: SDUPTHER

## 2023-07-18 RX ORDER — TRAZODONE HYDROCHLORIDE 50 MG/1
50 TABLET ORAL NIGHTLY
Qty: 90 TABLET | Refills: 3 | Status: SHIPPED | OUTPATIENT
Start: 2023-07-18 | End: 2023-10-23 | Stop reason: SDUPTHER

## 2023-07-18 RX ORDER — PENICILLIN V POTASSIUM 500 MG/1
500 TABLET, FILM COATED ORAL EVERY 6 HOURS
Qty: 40 TABLET | Refills: 0 | Status: SHIPPED | OUTPATIENT
Start: 2023-07-18 | End: 2024-01-20 | Stop reason: CLARIF

## 2023-07-18 RX ORDER — DIVALPROEX SODIUM 500 MG/1
500 TABLET, FILM COATED, EXTENDED RELEASE ORAL
COMMUNITY
Start: 2023-06-26

## 2023-07-18 NOTE — PROGRESS NOTES
Subjective     Patient ID: Dion Flores is a 63 y.o. male.    Chief Complaint: Nasal Congestion, Annual Exam, and Medication Refill (Didn't bring medication today.)    Having dental issues - most of his teeth are gone.    Medication Refill  Pertinent negatives include no arthralgias, chest pain, coughing, fatigue, fever, headaches, myalgias, nausea or vomiting.   Review of Systems   Constitutional:  Negative for fatigue and fever.   HENT:  Positive for dental problem.    Eyes:  Negative for discharge.   Respiratory:  Negative for cough, choking, chest tightness and shortness of breath.    Cardiovascular:  Negative for chest pain and leg swelling.   Gastrointestinal:  Negative for constipation, diarrhea, nausea and vomiting.   Genitourinary:  Negative for discharge and flank pain.   Musculoskeletal:  Negative for arthralgias and myalgias.   Allergic/Immunologic: Negative for environmental allergies.   Neurological:  Negative for headaches and memory loss.   Psychiatric/Behavioral:  Negative for behavioral problems and hallucinations.         Objective     Physical Exam  Vitals and nursing note reviewed.   Constitutional:       Appearance: Normal appearance. He is normal weight.   HENT:      Head: Normocephalic and atraumatic.      Right Ear: Tympanic membrane normal.      Left Ear: Tympanic membrane normal.      Nose: Nose normal.      Mouth/Throat:      Mouth: Mucous membranes are moist.      Comments: Gum soreness  Eyes:      Extraocular Movements: Extraocular movements intact.      Conjunctiva/sclera: Conjunctivae normal.      Pupils: Pupils are equal, round, and reactive to light.   Cardiovascular:      Rate and Rhythm: Normal rate and regular rhythm.      Pulses: Normal pulses.   Pulmonary:      Effort: Pulmonary effort is normal.      Breath sounds: Normal breath sounds.   Abdominal:      General: Abdomen is flat. Bowel sounds are normal.      Palpations: Abdomen is soft.   Musculoskeletal:         General:  Normal range of motion.      Cervical back: Normal range of motion and neck supple.   Skin:     General: Skin is warm and dry.   Neurological:      General: No focal deficit present.      Mental Status: He is alert and oriented to person, place, and time.   Psychiatric:         Mood and Affect: Mood normal.          Assessment and Plan     1. Gingivitis  -     penicillin v potassium (VEETID) 500 MG tablet; Take 1 tablet (500 mg total) by mouth every 6 (six) hours.  Dispense: 40 tablet; Refill: 0    2. Schizophrenia, unspecified type  -     traZODone (DESYREL) 50 MG tablet; Take 1 tablet (50 mg total) by mouth every evening.  Dispense: 90 tablet; Refill: 3  -     risperiDONE (RISPERDAL) 2 MG tablet; Take 1 tablet (2 mg total) by mouth every evening.  Dispense: 90 tablet; Refill: 3  -     benztropine (COGENTIN) 1 MG tablet; Take 1 tablet (1 mg total) by mouth 2 (two) times daily.  Dispense: 180 tablet; Refill: 3        RTC if s/sx continue         No follow-ups on file.

## 2023-08-14 PROBLEM — Z00.8 MEDICAL CLEARANCE FOR PSYCHIATRIC ADMISSION: Status: RESOLVED | Noted: 2023-05-12 | Resolved: 2023-08-14

## 2023-09-14 ENCOUNTER — OFFICE VISIT (OUTPATIENT)
Dept: PRIMARY CARE CLINIC | Facility: CLINIC | Age: 63
End: 2023-09-14
Payer: MEDICAID

## 2023-09-14 VITALS
HEART RATE: 130 BPM | OXYGEN SATURATION: 96 % | BODY MASS INDEX: 26.66 KG/M2 | HEIGHT: 69 IN | WEIGHT: 180 LBS | DIASTOLIC BLOOD PRESSURE: 78 MMHG | TEMPERATURE: 98 F | SYSTOLIC BLOOD PRESSURE: 135 MMHG | RESPIRATION RATE: 18 BRPM

## 2023-09-14 DIAGNOSIS — R05.9 COUGH, UNSPECIFIED TYPE: ICD-10-CM

## 2023-09-14 DIAGNOSIS — F20.0 PARANOID SCHIZOPHRENIA: ICD-10-CM

## 2023-09-14 DIAGNOSIS — M19.90 ARTHRITIS: Primary | ICD-10-CM

## 2023-09-14 PROCEDURE — 99213 OFFICE O/P EST LOW 20 MIN: CPT | Mod: ,,, | Performed by: FAMILY MEDICINE

## 2023-09-14 PROCEDURE — 3078F DIAST BP <80 MM HG: CPT | Mod: ,,, | Performed by: FAMILY MEDICINE

## 2023-09-14 PROCEDURE — 3078F PR MOST RECENT DIASTOLIC BLOOD PRESSURE < 80 MM HG: ICD-10-PCS | Mod: ,,, | Performed by: FAMILY MEDICINE

## 2023-09-14 PROCEDURE — 99213 PR OFFICE/OUTPT VISIT, EST, LEVL III, 20-29 MIN: ICD-10-PCS | Mod: ,,, | Performed by: FAMILY MEDICINE

## 2023-09-14 PROCEDURE — 3008F BODY MASS INDEX DOCD: CPT | Mod: ,,, | Performed by: FAMILY MEDICINE

## 2023-09-14 PROCEDURE — 3008F PR BODY MASS INDEX (BMI) DOCUMENTED: ICD-10-PCS | Mod: ,,, | Performed by: FAMILY MEDICINE

## 2023-09-14 RX ORDER — DEXCHLORPHENIRAMINE MALEATE, DEXTROMETHORPHAN HBR, PHENYLEPHRINE HCL 1; 10; 5 MG/5ML; MG/5ML; MG/5ML
10 SYRUP ORAL
Qty: 240 ML | Refills: 1 | Status: SHIPPED | OUTPATIENT
Start: 2023-09-14 | End: 2024-02-02 | Stop reason: CLARIF

## 2023-09-14 RX ORDER — IBUPROFEN 800 MG/1
800 TABLET ORAL 3 TIMES DAILY
Qty: 90 TABLET | Refills: 2 | Status: SHIPPED | OUTPATIENT
Start: 2023-09-14

## 2023-09-14 NOTE — PROGRESS NOTES
Subjective     Patient ID: Dion Flores is a 63 y.o. male.    Chief Complaint: Cough    Here wanting some cough syrup and ibuprofen.    Cough  Pertinent negatives include no chest pain, fever, headaches, myalgias or shortness of breath. There is no history of environmental allergies.     Review of Systems   Constitutional:  Negative for fatigue and fever.   HENT:  Negative for dental problem.    Eyes:  Negative for discharge.   Respiratory:  Positive for cough. Negative for choking, chest tightness and shortness of breath.    Cardiovascular:  Negative for chest pain and leg swelling.   Gastrointestinal:  Negative for constipation, diarrhea, nausea and vomiting.   Genitourinary:  Negative for discharge and flank pain.   Musculoskeletal:  Positive for arthralgias. Negative for myalgias.   Allergic/Immunologic: Negative for environmental allergies.   Neurological:  Negative for headaches and memory loss.   Psychiatric/Behavioral:  Negative for behavioral problems and hallucinations.           Objective     Physical Exam  Vitals and nursing note reviewed.   Constitutional:       Appearance: Normal appearance. He is normal weight.   HENT:      Head: Normocephalic and atraumatic.      Right Ear: Tympanic membrane normal.      Left Ear: Tympanic membrane normal.      Nose: Nose normal.      Mouth/Throat:      Mouth: Mucous membranes are moist.   Eyes:      Extraocular Movements: Extraocular movements intact.      Conjunctiva/sclera: Conjunctivae normal.      Pupils: Pupils are equal, round, and reactive to light.   Cardiovascular:      Rate and Rhythm: Normal rate and regular rhythm.      Pulses: Normal pulses.   Pulmonary:      Effort: Pulmonary effort is normal.      Breath sounds: Normal breath sounds.      Comments: Clear to auscultation bilaterally  Abdominal:      General: Abdomen is flat. Bowel sounds are normal.      Palpations: Abdomen is soft.   Musculoskeletal:         General: Normal range of motion.       Cervical back: Normal range of motion and neck supple.   Skin:     General: Skin is warm and dry.   Neurological:      General: No focal deficit present.      Mental Status: He is alert and oriented to person, place, and time.   Psychiatric:         Mood and Affect: Mood normal.            Assessment and Plan     1. Arthritis  -     ibuprofen (ADVIL,MOTRIN) 800 MG tablet; Take 1 tablet (800 mg total) by mouth 3 (three) times daily.  Dispense: 90 tablet; Refill: 2    2. Paranoid schizophrenia    3. Cough, unspecified type  -     dexchlorphen-phenylephrine-DM (POLYTUSSIN DM,DEXCHLORPHENRMN,) 1-5-10 mg/5 mL Syrp; Take 10 mLs by mouth every 6 (six) hours while awake.  Dispense: 240 mL; Refill: 1        RTC if s/sx continue       No follow-ups on file.

## 2023-10-23 DIAGNOSIS — F20.9 SCHIZOPHRENIA, UNSPECIFIED TYPE: ICD-10-CM

## 2023-10-23 RX ORDER — BENZTROPINE MESYLATE 1 MG/1
1 TABLET ORAL 2 TIMES DAILY
Qty: 180 TABLET | Refills: 3 | Status: SHIPPED | OUTPATIENT
Start: 2023-10-23

## 2023-10-23 RX ORDER — RISPERIDONE 2 MG/1
2 TABLET ORAL NIGHTLY
Qty: 90 TABLET | Refills: 3 | Status: SHIPPED | OUTPATIENT
Start: 2023-10-23

## 2023-10-23 RX ORDER — TRAZODONE HYDROCHLORIDE 50 MG/1
50 TABLET ORAL NIGHTLY
Qty: 90 TABLET | Refills: 3 | Status: SHIPPED | OUTPATIENT
Start: 2023-10-23

## 2024-01-20 ENCOUNTER — HOSPITAL ENCOUNTER (EMERGENCY)
Facility: HOSPITAL | Age: 64
Discharge: HOME | End: 2024-01-20
Attending: FAMILY MEDICINE
Payer: MEDICAID

## 2024-01-20 VITALS
BODY MASS INDEX: 25.84 KG/M2 | SYSTOLIC BLOOD PRESSURE: 156 MMHG | OXYGEN SATURATION: 96 % | WEIGHT: 174.5 LBS | HEART RATE: 82 BPM | TEMPERATURE: 98 F | DIASTOLIC BLOOD PRESSURE: 71 MMHG | HEIGHT: 69 IN | RESPIRATION RATE: 20 BRPM

## 2024-01-20 DIAGNOSIS — R05.1 ACUTE COUGH: Primary | ICD-10-CM

## 2024-01-20 PROCEDURE — 25000003 PHARM REV CODE 250: Performed by: FAMILY MEDICINE

## 2024-01-20 PROCEDURE — 99283 EMERGENCY DEPT VISIT LOW MDM: CPT

## 2024-01-20 PROCEDURE — 63600175 PHARM REV CODE 636 W HCPCS: Performed by: FAMILY MEDICINE

## 2024-01-20 PROCEDURE — 99284 EMERGENCY DEPT VISIT MOD MDM: CPT | Mod: ,,, | Performed by: FAMILY MEDICINE

## 2024-01-20 RX ORDER — PREDNISONE 20 MG/1
40 TABLET ORAL
Status: COMPLETED | OUTPATIENT
Start: 2024-01-20 | End: 2024-01-20

## 2024-01-20 RX ORDER — AMOXICILLIN AND CLAVULANATE POTASSIUM 875; 125 MG/1; MG/1
1 TABLET, FILM COATED ORAL
Status: COMPLETED | OUTPATIENT
Start: 2024-01-20 | End: 2024-01-20

## 2024-01-20 RX ORDER — AZITHROMYCIN 500 MG/1
500 TABLET, FILM COATED ORAL DAILY
Qty: 4 TABLET | Refills: 0 | Status: SHIPPED | OUTPATIENT
Start: 2024-01-20 | End: 2024-01-24

## 2024-01-20 RX ORDER — CODEINE PHOSPHATE AND GUAIFENESIN 10; 100 MG/5ML; MG/5ML
5 SOLUTION ORAL ONCE
Status: COMPLETED | OUTPATIENT
Start: 2024-01-20 | End: 2024-01-20

## 2024-01-20 RX ORDER — GUAIFENESIN 600 MG/1
1200 TABLET, EXTENDED RELEASE ORAL 2 TIMES DAILY
Qty: 40 TABLET | Refills: 0 | Status: SHIPPED | OUTPATIENT
Start: 2024-01-20 | End: 2024-01-30

## 2024-01-20 RX ORDER — CODEINE PHOSPHATE AND GUAIFENESIN 10; 100 MG/5ML; MG/5ML
5 SOLUTION ORAL EVERY 4 HOURS PRN
Status: DISCONTINUED | OUTPATIENT
Start: 2024-01-20 | End: 2024-01-20

## 2024-01-20 RX ADMIN — GUAIFENESIN AND CODEINE PHOSPHATE 5 ML: 100; 10 SOLUTION ORAL at 04:01

## 2024-01-20 RX ADMIN — AMOXICILLIN AND CLAVULANATE POTASSIUM 1 TABLET: 875; 125 TABLET, FILM COATED ORAL at 04:01

## 2024-01-20 RX ADMIN — PREDNISONE 40 MG: 20 TABLET ORAL at 04:01
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
## 2024-01-20 NOTE — ED NOTES
"Spoke with sister kiesha song in regards to patient-"we didn't know where he was -he walks every where and it will be cold tonight-we will come get him"  "

## 2024-02-01 ENCOUNTER — HOSPITAL ENCOUNTER (EMERGENCY)
Facility: HOSPITAL | Age: 64
Discharge: HOME | End: 2024-02-02
Attending: INTERNAL MEDICINE
Payer: MEDICAID

## 2024-02-01 DIAGNOSIS — E87.6 HYPOKALEMIA: Primary | ICD-10-CM

## 2024-02-01 DIAGNOSIS — F20.0 PARANOID SCHIZOPHRENIA: ICD-10-CM

## 2024-02-01 PROCEDURE — 99284 EMERGENCY DEPT VISIT MOD MDM: CPT | Mod: ,,, | Performed by: INTERNAL MEDICINE

## 2024-02-01 PROCEDURE — 99284 EMERGENCY DEPT VISIT MOD MDM: CPT

## 2024-02-02 VITALS
OXYGEN SATURATION: 98 % | WEIGHT: 168.88 LBS | HEIGHT: 69 IN | TEMPERATURE: 98 F | RESPIRATION RATE: 18 BRPM | DIASTOLIC BLOOD PRESSURE: 84 MMHG | BODY MASS INDEX: 25.01 KG/M2 | SYSTOLIC BLOOD PRESSURE: 132 MMHG | HEART RATE: 84 BPM

## 2024-02-02 LAB
ALBUMIN SERPL BCP-MCNC: 3.3 G/DL (ref 3.5–5)
ALBUMIN/GLOB SERPL: 0.9 {RATIO}
ALP SERPL-CCNC: 65 U/L (ref 45–115)
ALT SERPL W P-5'-P-CCNC: 29 U/L (ref 16–61)
ANION GAP SERPL CALCULATED.3IONS-SCNC: 10 MMOL/L (ref 7–16)
AST SERPL W P-5'-P-CCNC: 24 U/L (ref 15–37)
BASOPHILS # BLD AUTO: 0.02 K/UL (ref 0–0.2)
BASOPHILS NFR BLD AUTO: 0.6 % (ref 0–1)
BILIRUB SERPL-MCNC: 0.4 MG/DL (ref ?–1.2)
BUN SERPL-MCNC: 10 MG/DL (ref 7–18)
BUN/CREAT SERPL: 9 (ref 6–20)
CALCIUM SERPL-MCNC: 8.6 MG/DL (ref 8.5–10.1)
CHLORIDE SERPL-SCNC: 100 MMOL/L (ref 98–107)
CO2 SERPL-SCNC: 31 MMOL/L (ref 21–32)
CREAT SERPL-MCNC: 1.09 MG/DL (ref 0.7–1.3)
DIFFERENTIAL METHOD BLD: ABNORMAL
EGFR (NO RACE VARIABLE) (RUSH/TITUS): 76 ML/MIN/1.73M2
EOSINOPHIL # BLD AUTO: 0.1 K/UL (ref 0–0.5)
EOSINOPHIL NFR BLD AUTO: 2.8 % (ref 1–4)
ERYTHROCYTE [DISTWIDTH] IN BLOOD BY AUTOMATED COUNT: 11.9 % (ref 11.5–14.5)
ETHANOL, BLOOD (CATEGORY): NOT DETECTED
GLOBULIN SER-MCNC: 3.6 G/DL (ref 2–4)
GLUCOSE SERPL-MCNC: 121 MG/DL (ref 74–106)
HCT VFR BLD AUTO: 38 % (ref 40–54)
HGB BLD-MCNC: 13 G/DL (ref 13.5–18)
IMM GRANULOCYTES # BLD AUTO: 0.01 K/UL (ref 0–0.04)
IMM GRANULOCYTES NFR BLD: 0.3 % (ref 0–0.4)
LYMPHOCYTES # BLD AUTO: 1.35 K/UL (ref 1–4.8)
LYMPHOCYTES NFR BLD AUTO: 37.4 % (ref 27–41)
MCH RBC QN AUTO: 33 PG (ref 27–31)
MCHC RBC AUTO-ENTMCNC: 34.2 G/DL (ref 32–36)
MCV RBC AUTO: 96.4 FL (ref 80–96)
MONOCYTES # BLD AUTO: 0.5 K/UL (ref 0–0.8)
MONOCYTES NFR BLD AUTO: 13.9 % (ref 2–6)
MPC BLD CALC-MCNC: 9.8 FL (ref 9.4–12.4)
NEUTROPHILS # BLD AUTO: 1.63 K/UL (ref 1.8–7.7)
NEUTROPHILS NFR BLD AUTO: 45 % (ref 53–65)
NRBC # BLD AUTO: 0 X10E3/UL
NRBC, AUTO (.00): 0 %
PLATELET # BLD AUTO: 191 K/UL (ref 150–400)
POTASSIUM SERPL-SCNC: 3 MMOL/L (ref 3.5–5.1)
PROT SERPL-MCNC: 6.9 G/DL (ref 6.4–8.2)
RBC # BLD AUTO: 3.94 M/UL (ref 4.6–6.2)
SODIUM SERPL-SCNC: 138 MMOL/L (ref 136–145)
WBC # BLD AUTO: 3.61 K/UL (ref 4.5–11)

## 2024-02-02 PROCEDURE — 25000003 PHARM REV CODE 250: Performed by: INTERNAL MEDICINE

## 2024-02-02 PROCEDURE — 80053 COMPREHEN METABOLIC PANEL: CPT | Performed by: INTERNAL MEDICINE

## 2024-02-02 PROCEDURE — 82077 ASSAY SPEC XCP UR&BREATH IA: CPT | Performed by: INTERNAL MEDICINE

## 2024-02-02 PROCEDURE — 85025 COMPLETE CBC W/AUTO DIFF WBC: CPT | Performed by: INTERNAL MEDICINE

## 2024-02-02 RX ORDER — POTASSIUM CHLORIDE 750 MG/1
10 TABLET, EXTENDED RELEASE ORAL
Status: COMPLETED | OUTPATIENT
Start: 2024-02-02 | End: 2024-02-02

## 2024-02-02 RX ADMIN — POTASSIUM CHLORIDE 10 MEQ: 750 TABLET, FILM COATED, EXTENDED RELEASE ORAL at 01:02
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
## 2024-02-02 NOTE — ED NOTES
"Attempted to obtain contact information on anyone to call to pick patient up from facility, denies anyone to available to provide as contact, states he has no one to call and "I would rather not bother calling my sister".   "
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
## 2024-05-01 ENCOUNTER — HOSPITAL ENCOUNTER (EMERGENCY)
Facility: HOSPITAL | Age: 64
Discharge: HOME | End: 2024-05-01
Attending: EMERGENCY MEDICINE
Payer: MEDICAID

## 2024-05-01 VITALS
SYSTOLIC BLOOD PRESSURE: 124 MMHG | WEIGHT: 159.63 LBS | RESPIRATION RATE: 20 BRPM | OXYGEN SATURATION: 97 % | DIASTOLIC BLOOD PRESSURE: 75 MMHG | HEART RATE: 87 BPM | TEMPERATURE: 99 F | HEIGHT: 70 IN | BODY MASS INDEX: 22.85 KG/M2

## 2024-05-01 DIAGNOSIS — M54.2 NECK PAIN: ICD-10-CM

## 2024-05-01 DIAGNOSIS — M50.30 DEGENERATIVE DISC DISEASE, CERVICAL: Primary | ICD-10-CM

## 2024-05-01 PROCEDURE — 99284 EMERGENCY DEPT VISIT MOD MDM: CPT | Mod: ,,, | Performed by: EMERGENCY MEDICINE

## 2024-05-01 PROCEDURE — 99284 EMERGENCY DEPT VISIT MOD MDM: CPT | Mod: 25

## 2024-05-01 RX ORDER — NAPROXEN 500 MG/1
500 TABLET ORAL 2 TIMES DAILY PRN
Qty: 14 TABLET | Refills: 0 | Status: SHIPPED | OUTPATIENT
Start: 2024-05-01 | End: 2024-05-08

## 2024-05-01 RX ORDER — ORPHENADRINE CITRATE 100 MG/1
100 TABLET, EXTENDED RELEASE ORAL 2 TIMES DAILY PRN
Qty: 20 TABLET | Refills: 0 | Status: SHIPPED | OUTPATIENT
Start: 2024-05-01 | End: 2024-05-11
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
## 2024-05-01 NOTE — ED TRIAGE NOTES
"Presented to ER with complaint of neck pain and states "my neck feels like I can't hardly keep my head up". Reports pain started today.   "

## 2024-05-14 ENCOUNTER — HOSPITAL ENCOUNTER (EMERGENCY)
Facility: HOSPITAL | Age: 64
Discharge: HOME | End: 2024-05-14
Attending: INTERNAL MEDICINE
Payer: MEDICAID

## 2024-05-14 VITALS
DIASTOLIC BLOOD PRESSURE: 75 MMHG | TEMPERATURE: 99 F | SYSTOLIC BLOOD PRESSURE: 130 MMHG | HEIGHT: 69 IN | BODY MASS INDEX: 22.98 KG/M2 | OXYGEN SATURATION: 99 % | WEIGHT: 155.13 LBS | RESPIRATION RATE: 20 BRPM | HEART RATE: 84 BPM

## 2024-05-14 DIAGNOSIS — R05.9 COUGH: ICD-10-CM

## 2024-05-14 PROCEDURE — 99283 EMERGENCY DEPT VISIT LOW MDM: CPT | Mod: 25

## 2024-05-14 PROCEDURE — 99283 EMERGENCY DEPT VISIT LOW MDM: CPT | Mod: ,,, | Performed by: INTERNAL MEDICINE

## 2024-05-14 PROCEDURE — 25000003 PHARM REV CODE 250: Performed by: INTERNAL MEDICINE

## 2024-05-14 RX ORDER — GUAIFENESIN 100 MG/5ML
200 SOLUTION ORAL ONCE
Status: COMPLETED | OUTPATIENT
Start: 2024-05-14 | End: 2024-05-14

## 2024-05-14 RX ADMIN — GUAIFENESIN 200 MG: 100 SOLUTION ORAL at 09:05
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
## 2024-05-23 ENCOUNTER — OFFICE VISIT (OUTPATIENT)
Dept: PRIMARY CARE CLINIC | Facility: CLINIC | Age: 64
End: 2024-05-23
Payer: MEDICAID

## 2024-05-23 VITALS
BODY MASS INDEX: 23.7 KG/M2 | HEART RATE: 84 BPM | WEIGHT: 160 LBS | TEMPERATURE: 99 F | HEIGHT: 69 IN | RESPIRATION RATE: 20 BRPM | DIASTOLIC BLOOD PRESSURE: 60 MMHG | SYSTOLIC BLOOD PRESSURE: 112 MMHG | OXYGEN SATURATION: 97 %

## 2024-05-23 DIAGNOSIS — Z12.5 SCREENING FOR MALIGNANT NEOPLASM OF PROSTATE: ICD-10-CM

## 2024-05-23 DIAGNOSIS — F20.0 PARANOID SCHIZOPHRENIA: Primary | ICD-10-CM

## 2024-05-23 DIAGNOSIS — F20.9 SCHIZOPHRENIA, UNSPECIFIED TYPE: ICD-10-CM

## 2024-05-23 DIAGNOSIS — Z79.899 LONG TERM USE OF DRUG: ICD-10-CM

## 2024-05-23 PROCEDURE — 80061 LIPID PANEL: CPT | Mod: ,,, | Performed by: CLINICAL MEDICAL LABORATORY

## 2024-05-23 PROCEDURE — 99213 OFFICE O/P EST LOW 20 MIN: CPT | Mod: ,,, | Performed by: FAMILY MEDICINE

## 2024-05-23 PROCEDURE — 3074F SYST BP LT 130 MM HG: CPT | Mod: ,,, | Performed by: FAMILY MEDICINE

## 2024-05-23 PROCEDURE — 84153 ASSAY OF PSA TOTAL: CPT | Mod: ,,, | Performed by: CLINICAL MEDICAL LABORATORY

## 2024-05-23 PROCEDURE — 3008F BODY MASS INDEX DOCD: CPT | Mod: ,,, | Performed by: FAMILY MEDICINE

## 2024-05-23 PROCEDURE — 3078F DIAST BP <80 MM HG: CPT | Mod: ,,, | Performed by: FAMILY MEDICINE

## 2024-05-23 PROCEDURE — 80053 COMPREHEN METABOLIC PANEL: CPT | Mod: ,,, | Performed by: CLINICAL MEDICAL LABORATORY

## 2024-05-23 PROCEDURE — 85025 COMPLETE CBC W/AUTO DIFF WBC: CPT | Mod: ,,, | Performed by: CLINICAL MEDICAL LABORATORY

## 2024-05-23 RX ORDER — RISPERIDONE 2 MG/1
2 TABLET ORAL NIGHTLY
Qty: 90 TABLET | Refills: 3 | Status: SHIPPED | OUTPATIENT
Start: 2024-05-23

## 2024-05-23 RX ORDER — TRAZODONE HYDROCHLORIDE 50 MG/1
50 TABLET ORAL NIGHTLY
Qty: 90 TABLET | Refills: 3 | Status: SHIPPED | OUTPATIENT
Start: 2024-05-23

## 2024-05-23 RX ORDER — BENZTROPINE MESYLATE 1 MG/1
1 TABLET ORAL 2 TIMES DAILY
Qty: 180 TABLET | Refills: 3 | Status: SHIPPED | OUTPATIENT
Start: 2024-05-23

## 2024-05-23 RX ORDER — DIVALPROEX SODIUM 500 MG/1
500 TABLET, FILM COATED, EXTENDED RELEASE ORAL DAILY
Qty: 90 TABLET | Refills: 3 | Status: SHIPPED | OUTPATIENT
Start: 2024-05-23

## 2024-05-23 NOTE — PROGRESS NOTES
Subjective     Patient ID: Dion Flores is a 64 y.o. male.    Chief Complaint: Annual Exam    Pt. Needs refills. Wants to go back to the [facility] in [place]. Discussion. To see his mental health person next week.      Review of Systems   Constitutional:  Negative for fatigue and fever.   HENT:  Negative for dental problem.    Eyes:  Negative for discharge.   Respiratory:  Negative for cough, choking, chest tightness and shortness of breath.    Cardiovascular:  Negative for chest pain and leg swelling.   Gastrointestinal:  Negative for constipation, diarrhea, nausea and vomiting.   Genitourinary:  Negative for discharge and flank pain.   Musculoskeletal:  Negative for arthralgias and myalgias.   Allergic/Immunologic: Negative for environmental allergies.   Neurological:  Negative for headaches and memory loss.   Psychiatric/Behavioral:  Positive for hallucinations. Negative for behavioral problems. The patient is nervous/anxious.           Objective     Physical Exam  Vitals and nursing note reviewed.   Constitutional:       Appearance: Normal appearance. He is normal weight.   HENT:      Head: Normocephalic and atraumatic.      Right Ear: Tympanic membrane normal.      Left Ear: Tympanic membrane normal.      Nose: Nose normal.      Mouth/Throat:      Mouth: Mucous membranes are moist.   Eyes:      Extraocular Movements: Extraocular movements intact.      Conjunctiva/sclera: Conjunctivae normal.      Pupils: Pupils are equal, round, and reactive to light.   Cardiovascular:      Rate and Rhythm: Normal rate and regular rhythm.      Pulses: Normal pulses.   Pulmonary:      Effort: Pulmonary effort is normal.      Breath sounds: Normal breath sounds.   Abdominal:      General: Abdomen is flat. Bowel sounds are normal.      Palpations: Abdomen is soft.   Musculoskeletal:         General: Normal range of motion.      Cervical back: Normal range of motion and neck supple.   Skin:     General: Skin is warm and  dry.   Neurological:      General: No focal deficit present.      Mental Status: He is alert and oriented to person, place, and time.   Psychiatric:         Mood and Affect: Mood normal.            Assessment and Plan     1. Paranoid schizophrenia    2. Schizophrenia, unspecified type  -     benztropine (COGENTIN) 1 MG tablet; Take 1 tablet (1 mg total) by mouth 2 (two) times daily.  Dispense: 180 tablet; Refill: 3  -     divalproex ER (DEPAKOTE ER) 500 MG Tb24; Take 1 tablet (500 mg total) by mouth once daily.  Dispense: 90 tablet; Refill: 3  -     risperiDONE (RISPERDAL) 2 MG tablet; Take 1 tablet (2 mg total) by mouth every evening.  Dispense: 90 tablet; Refill: 3  -     traZODone (DESYREL) 50 MG tablet; Take 1 tablet (50 mg total) by mouth every evening.  Dispense: 90 tablet; Refill: 3        RTC as needed  To see mental health next week         No follow-ups on file.

## 2024-05-24 LAB
ALBUMIN SERPL BCP-MCNC: 3.9 G/DL (ref 3.5–5)
ALBUMIN/GLOB SERPL: 1.1 {RATIO}
ALP SERPL-CCNC: 50 U/L (ref 45–115)
ALT SERPL W P-5'-P-CCNC: 31 U/L (ref 16–61)
ANION GAP SERPL CALCULATED.3IONS-SCNC: 8 MMOL/L (ref 7–16)
AST SERPL W P-5'-P-CCNC: 28 U/L (ref 15–37)
BASOPHILS # BLD AUTO: 0.02 K/UL (ref 0–0.2)
BASOPHILS NFR BLD AUTO: 0.3 % (ref 0–1)
BILIRUB SERPL-MCNC: 0.6 MG/DL (ref ?–1.2)
BUN SERPL-MCNC: 9 MG/DL (ref 7–18)
BUN/CREAT SERPL: 8 (ref 6–20)
CALCIUM SERPL-MCNC: 9.3 MG/DL (ref 8.5–10.1)
CHLORIDE SERPL-SCNC: 108 MMOL/L (ref 98–107)
CHOLEST SERPL-MCNC: 122 MG/DL (ref 0–200)
CHOLEST/HDLC SERPL: 2.1 {RATIO}
CO2 SERPL-SCNC: 30 MMOL/L (ref 21–32)
CREAT SERPL-MCNC: 1.18 MG/DL (ref 0.7–1.3)
DIFFERENTIAL METHOD BLD: ABNORMAL
EGFR (NO RACE VARIABLE) (RUSH/TITUS): 69 ML/MIN/1.73M2
EOSINOPHIL # BLD AUTO: 0.15 K/UL (ref 0–0.5)
EOSINOPHIL NFR BLD AUTO: 2.1 % (ref 1–4)
ERYTHROCYTE [DISTWIDTH] IN BLOOD BY AUTOMATED COUNT: 12.1 % (ref 11.5–14.5)
GLOBULIN SER-MCNC: 3.7 G/DL (ref 2–4)
GLUCOSE SERPL-MCNC: 74 MG/DL (ref 74–106)
HCT VFR BLD AUTO: 42.9 % (ref 40–54)
HDLC SERPL-MCNC: 58 MG/DL (ref 40–60)
HGB BLD-MCNC: 13.4 G/DL (ref 13.5–18)
IMM GRANULOCYTES # BLD AUTO: 0.02 K/UL (ref 0–0.04)
IMM GRANULOCYTES NFR BLD: 0.3 % (ref 0–0.4)
LDLC SERPL CALC-MCNC: 38 MG/DL
LYMPHOCYTES # BLD AUTO: 0.87 K/UL (ref 1–4.8)
LYMPHOCYTES NFR BLD AUTO: 12 % (ref 27–41)
MCH RBC QN AUTO: 32.8 PG (ref 27–31)
MCHC RBC AUTO-ENTMCNC: 31.2 G/DL (ref 32–36)
MCV RBC AUTO: 104.9 FL (ref 80–96)
MONOCYTES # BLD AUTO: 0.56 K/UL (ref 0–0.8)
MONOCYTES NFR BLD AUTO: 7.7 % (ref 2–6)
MPC BLD CALC-MCNC: 11.1 FL (ref 9.4–12.4)
NEUTROPHILS # BLD AUTO: 5.66 K/UL (ref 1.8–7.7)
NEUTROPHILS NFR BLD AUTO: 77.6 % (ref 53–65)
NONHDLC SERPL-MCNC: 64 MG/DL
NRBC # BLD AUTO: 0 X10E3/UL
NRBC, AUTO (.00): 0 %
PLATELET # BLD AUTO: 196 K/UL (ref 150–400)
POTASSIUM SERPL-SCNC: 3.8 MMOL/L (ref 3.5–5.1)
PROT SERPL-MCNC: 7.6 G/DL (ref 6.4–8.2)
PSA SERPL-MCNC: 10.2 NG/ML
RBC # BLD AUTO: 4.09 M/UL (ref 4.6–6.2)
SODIUM SERPL-SCNC: 142 MMOL/L (ref 136–145)
TRIGL SERPL-MCNC: 130 MG/DL (ref 35–150)
VLDLC SERPL-MCNC: 26 MG/DL
WBC # BLD AUTO: 7.28 K/UL (ref 4.5–11)

## 2024-05-29 ENCOUNTER — TELEPHONE (OUTPATIENT)
Dept: PRIMARY CARE CLINIC | Facility: CLINIC | Age: 64
End: 2024-05-29
Payer: MEDICAID

## 2024-05-29 DIAGNOSIS — R97.20 ELEVATED PSA: Primary | ICD-10-CM

## 2024-07-09 ENCOUNTER — OFFICE VISIT (OUTPATIENT)
Dept: PRIMARY CARE CLINIC | Facility: CLINIC | Age: 64
End: 2024-07-09
Payer: MEDICAID

## 2024-07-09 VITALS
BODY MASS INDEX: 24.59 KG/M2 | WEIGHT: 166 LBS | HEART RATE: 77 BPM | DIASTOLIC BLOOD PRESSURE: 68 MMHG | TEMPERATURE: 99 F | HEIGHT: 69 IN | RESPIRATION RATE: 20 BRPM | SYSTOLIC BLOOD PRESSURE: 128 MMHG | OXYGEN SATURATION: 96 %

## 2024-07-09 DIAGNOSIS — F20.0 PARANOID SCHIZOPHRENIA: Primary | ICD-10-CM

## 2024-07-09 PROCEDURE — 99212 OFFICE O/P EST SF 10 MIN: CPT | Mod: ,,, | Performed by: FAMILY MEDICINE

## 2024-07-09 PROCEDURE — 3008F BODY MASS INDEX DOCD: CPT | Mod: ,,, | Performed by: FAMILY MEDICINE

## 2024-07-09 PROCEDURE — 3074F SYST BP LT 130 MM HG: CPT | Mod: ,,, | Performed by: FAMILY MEDICINE

## 2024-07-09 PROCEDURE — 3078F DIAST BP <80 MM HG: CPT | Mod: ,,, | Performed by: FAMILY MEDICINE

## 2024-07-09 NOTE — PROGRESS NOTES
Subjective     Patient ID: Dion Flores is a 64 y.o. male.    Chief Complaint: Consult (Medication/POA is with him today-Niharika Cabrera/)    Pt. Here with his POA. She needs a letter stating that patient is not able to handle financial affairs. He is not able to do this. His check is gone within a day. He is not taking his schizophrenic meds. Last invega shot was a year ago. He is sleeping on the street in [place]. His POA wants to get him a place to stay. Refuses lab today.      Review of Systems   Constitutional:  Negative for fatigue and fever.   HENT:  Negative for dental problem.    Eyes:  Negative for discharge.   Respiratory:  Negative for cough, choking, chest tightness and shortness of breath.    Cardiovascular:  Negative for chest pain and leg swelling.   Gastrointestinal:  Negative for constipation, diarrhea, nausea and vomiting.   Genitourinary:  Negative for discharge and flank pain.   Musculoskeletal:  Negative for arthralgias and myalgias.   Allergic/Immunologic: Negative for environmental allergies.   Neurological:  Negative for headaches and memory loss.   Psychiatric/Behavioral:  Positive for hallucinations. Negative for behavioral problems.           Objective     Physical Exam  Vitals and nursing note reviewed. Exam conducted with a chaperone present.   Constitutional:       Appearance: Normal appearance. He is normal weight.      Comments: Pt. Is unkempt. He is dirty.   HENT:      Head: Normocephalic and atraumatic.      Right Ear: Tympanic membrane normal.      Left Ear: Tympanic membrane normal.      Nose: Nose normal.      Mouth/Throat:      Mouth: Mucous membranes are moist.   Eyes:      Extraocular Movements: Extraocular movements intact.      Conjunctiva/sclera: Conjunctivae normal.      Pupils: Pupils are equal, round, and reactive to light.   Cardiovascular:      Rate and Rhythm: Normal rate and regular rhythm.      Pulses: Normal pulses.   Pulmonary:      Effort: Pulmonary effort is  normal.      Breath sounds: Normal breath sounds.   Abdominal:      General: Abdomen is flat. Bowel sounds are normal.      Palpations: Abdomen is soft.   Musculoskeletal:         General: Normal range of motion.      Cervical back: Normal range of motion and neck supple.   Skin:     General: Skin is warm and dry.   Neurological:      General: No focal deficit present.      Mental Status: He is alert and oriented to person, place, and time.   Psychiatric:         Mood and Affect: Mood normal.            Assessment and Plan     1. Paranoid schizophrenia        Letter written  RTC as needed         No follow-ups on file.

## 2024-07-10 ENCOUNTER — HOSPITAL ENCOUNTER (EMERGENCY)
Facility: HOSPITAL | Age: 64
Discharge: HOME | End: 2024-07-11
Attending: EMERGENCY MEDICINE
Payer: MEDICAID

## 2024-07-10 DIAGNOSIS — M79.671 BILATERAL FOOT PAIN: Primary | ICD-10-CM
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
## 2024-07-10 DIAGNOSIS — M79.672 BILATERAL FOOT PAIN: Primary | ICD-10-CM

## 2024-07-10 RX ORDER — ACETAMINOPHEN 325 MG/1
650 TABLET ORAL
Status: COMPLETED | OUTPATIENT
Start: 2024-07-10 | End: 2024-07-11

## 2024-07-11 VITALS
WEIGHT: 162.88 LBS | TEMPERATURE: 98 F | OXYGEN SATURATION: 97 % | HEIGHT: 69 IN | DIASTOLIC BLOOD PRESSURE: 65 MMHG | HEART RATE: 85 BPM | SYSTOLIC BLOOD PRESSURE: 110 MMHG | BODY MASS INDEX: 24.12 KG/M2 | RESPIRATION RATE: 18 BRPM

## 2024-07-11 PROCEDURE — 25000003 PHARM REV CODE 250: Performed by: EMERGENCY MEDICINE

## 2024-07-11 RX ADMIN — ACETAMINOPHEN 650 MG: 325 TABLET ORAL at 12:07
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
## 2024-07-11 NOTE — ED TRIAGE NOTES
"Presented to ER with complaint of toes/feet sore from "walking to much" and requesting to have some betadine to put on them.    "

## 2024-11-03 ENCOUNTER — HOSPITAL ENCOUNTER (EMERGENCY)
Facility: HOSPITAL | Age: 64
Discharge: HOME | End: 2024-11-03
Attending: FAMILY MEDICINE
Payer: MEDICAID

## 2024-11-03 VITALS
HEIGHT: 69 IN | TEMPERATURE: 99 F | SYSTOLIC BLOOD PRESSURE: 161 MMHG | HEART RATE: 88 BPM | OXYGEN SATURATION: 94 % | RESPIRATION RATE: 18 BRPM | DIASTOLIC BLOOD PRESSURE: 80 MMHG | WEIGHT: 151 LBS | BODY MASS INDEX: 22.36 KG/M2

## 2024-11-03 DIAGNOSIS — M19.90 ARTHRITIS: Primary | ICD-10-CM

## 2024-11-03 PROCEDURE — 99282 EMERGENCY DEPT VISIT SF MDM: CPT

## 2024-11-03 PROCEDURE — 99283 EMERGENCY DEPT VISIT LOW MDM: CPT | Mod: ,,, | Performed by: FAMILY MEDICINE

## 2024-11-03 RX ORDER — ACETAMINOPHEN 500 MG
500 TABLET ORAL EVERY 6 HOURS PRN
Qty: 30 TABLET | Refills: 0 | Status: SHIPPED | OUTPATIENT
Start: 2024-11-03 | End: 2024-12-03
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
## 2024-11-15 DIAGNOSIS — F20.9 SCHIZOPHRENIA, UNSPECIFIED TYPE: ICD-10-CM

## 2024-11-15 RX ORDER — TRAZODONE HYDROCHLORIDE 50 MG/1
50 TABLET ORAL NIGHTLY
Qty: 90 TABLET | Refills: 0 | Status: SHIPPED | OUTPATIENT
Start: 2024-11-15

## 2024-11-15 NOTE — TELEPHONE ENCOUNTER
----- Message from Betsy Nunez sent at 11/15/2024 10:46 AM CST -----  Pt needs refill Trazadone in 90 day supply for insurance to pay for

## 2024-11-21 ENCOUNTER — HOSPITAL ENCOUNTER (EMERGENCY)
Facility: HOSPITAL | Age: 64
Discharge: HOME | End: 2024-11-21
Attending: INTERNAL MEDICINE
Payer: MEDICAID

## 2024-11-21 VITALS
HEART RATE: 95 BPM | TEMPERATURE: 98 F | RESPIRATION RATE: 18 BRPM | SYSTOLIC BLOOD PRESSURE: 150 MMHG | DIASTOLIC BLOOD PRESSURE: 81 MMHG | HEIGHT: 69 IN | BODY MASS INDEX: 21.48 KG/M2 | OXYGEN SATURATION: 97 % | WEIGHT: 145 LBS

## 2024-11-21 DIAGNOSIS — S50.01XA CONTUSION OF RIGHT ELBOW, INITIAL ENCOUNTER: Primary | ICD-10-CM

## 2024-11-21 DIAGNOSIS — T14.90XA INJURY: ICD-10-CM

## 2024-11-21 PROCEDURE — 99284 EMERGENCY DEPT VISIT MOD MDM: CPT | Mod: ,,, | Performed by: INTERNAL MEDICINE

## 2024-11-21 PROCEDURE — 99283 EMERGENCY DEPT VISIT LOW MDM: CPT | Mod: 25

## 2024-11-21 PROCEDURE — 25000003 PHARM REV CODE 250: Performed by: INTERNAL MEDICINE

## 2024-11-21 RX ORDER — NAPROXEN 500 MG/1
500 TABLET ORAL 2 TIMES DAILY WITH MEALS
Qty: 20 TABLET | Refills: 0 | Status: SHIPPED | OUTPATIENT
Start: 2024-11-21 | End: 2024-11-29

## 2024-11-21 RX ORDER — FLUOXETINE HYDROCHLORIDE 20 MG/1
20 CAPSULE ORAL
COMMUNITY
Start: 2024-10-16

## 2024-11-21 RX ORDER — IBUPROFEN 400 MG/1
400 TABLET ORAL
Status: COMPLETED | OUTPATIENT
Start: 2024-11-21 | End: 2024-11-21

## 2024-11-21 RX ADMIN — IBUPROFEN 400 MG: 400 TABLET, FILM COATED ORAL at 07:11
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
## 2024-11-29 ENCOUNTER — HOSPITAL ENCOUNTER (EMERGENCY)
Facility: HOSPITAL | Age: 64
Discharge: HOME | End: 2024-11-29
Attending: SPECIALIST
Payer: MEDICAID

## 2024-11-29 VITALS
RESPIRATION RATE: 16 BRPM | SYSTOLIC BLOOD PRESSURE: 166 MMHG | TEMPERATURE: 99 F | WEIGHT: 154.63 LBS | DIASTOLIC BLOOD PRESSURE: 88 MMHG | OXYGEN SATURATION: 98 % | BODY MASS INDEX: 22.9 KG/M2 | HEART RATE: 60 BPM | HEIGHT: 69 IN

## 2024-11-29 DIAGNOSIS — G44.52 NEW DAILY PERSISTENT HEADACHE: ICD-10-CM

## 2024-11-29 DIAGNOSIS — Z76.0 MEDICINE REFILL: Primary | ICD-10-CM

## 2024-11-29 DIAGNOSIS — F20.9 SCHIZOPHRENIA, UNSPECIFIED TYPE: ICD-10-CM

## 2024-11-29 PROCEDURE — 99284 EMERGENCY DEPT VISIT MOD MDM: CPT | Mod: ,,, | Performed by: SPECIALIST

## 2024-11-29 PROCEDURE — 99283 EMERGENCY DEPT VISIT LOW MDM: CPT

## 2024-11-29 PROCEDURE — 25000003 PHARM REV CODE 250: Performed by: SPECIALIST

## 2024-11-29 RX ORDER — ACETAMINOPHEN 325 MG/1
650 TABLET ORAL
Status: COMPLETED | OUTPATIENT
Start: 2024-11-29 | End: 2024-11-29

## 2024-11-29 RX ORDER — NAPROXEN 500 MG/1
500 TABLET ORAL 2 TIMES DAILY WITH MEALS
Qty: 20 TABLET | Refills: 0 | Status: SHIPPED | OUTPATIENT
Start: 2024-11-29 | End: 2024-12-09

## 2024-11-29 RX ORDER — RISPERIDONE 2 MG/1
2 TABLET ORAL NIGHTLY
Qty: 90 TABLET | Refills: 3 | Status: SHIPPED | OUTPATIENT
Start: 2024-11-29

## 2024-11-29 RX ORDER — DIVALPROEX SODIUM 500 MG/1
500 TABLET, FILM COATED, EXTENDED RELEASE ORAL DAILY
Qty: 90 TABLET | Refills: 3 | Status: SHIPPED | OUTPATIENT
Start: 2024-11-29

## 2024-11-29 RX ORDER — TRAZODONE HYDROCHLORIDE 50 MG/1
50 TABLET ORAL NIGHTLY
Qty: 90 TABLET | Refills: 0 | Status: SHIPPED | OUTPATIENT
Start: 2024-11-29

## 2024-11-29 RX ORDER — BENZTROPINE MESYLATE 1 MG/1
1 TABLET ORAL 2 TIMES DAILY
Qty: 180 TABLET | Refills: 3 | Status: SHIPPED | OUTPATIENT
Start: 2024-11-29

## 2024-11-29 RX ORDER — ACETAMINOPHEN 500 MG
500 TABLET ORAL EVERY 6 HOURS PRN
Qty: 30 TABLET | Refills: 0 | Status: SHIPPED | OUTPATIENT
Start: 2024-11-29 | End: 2024-12-29

## 2024-11-29 RX ADMIN — ACETAMINOPHEN 650 MG: 325 TABLET, FILM COATED ORAL at 06:11

## 2024-11-30 NOTE — ED TRIAGE NOTES
PATIENT AMBULATED TO ER FROM NEXT [facility] FOR C/O HEADACHE FOR THE LAST 2 HOURS; STATES HE LOST HIS MEDICINE BAG ABOUT 3 DAYS AGO & HAS NOT HAD ANY OF HIS MEDICATIONS
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
## 2024-12-06 ENCOUNTER — OFFICE VISIT (OUTPATIENT)
Dept: PRIMARY CARE CLINIC | Facility: CLINIC | Age: 64
End: 2024-12-06
Payer: MEDICAID

## 2024-12-06 VITALS
HEART RATE: 77 BPM | WEIGHT: 160 LBS | RESPIRATION RATE: 20 BRPM | DIASTOLIC BLOOD PRESSURE: 76 MMHG | BODY MASS INDEX: 23.7 KG/M2 | SYSTOLIC BLOOD PRESSURE: 138 MMHG | HEIGHT: 69 IN | TEMPERATURE: 99 F | OXYGEN SATURATION: 98 %

## 2024-12-06 DIAGNOSIS — J06.9 UPPER RESPIRATORY TRACT INFECTION, UNSPECIFIED TYPE: Primary | ICD-10-CM

## 2024-12-06 RX ORDER — TRAZODONE HYDROCHLORIDE 100 MG/1
100 TABLET ORAL NIGHTLY
COMMUNITY
Start: 2024-10-16

## 2024-12-06 RX ORDER — CEFDINIR 300 MG/1
300 CAPSULE ORAL 2 TIMES DAILY
Qty: 20 CAPSULE | Refills: 0 | Status: SHIPPED | OUTPATIENT
Start: 2024-12-06 | End: 2024-12-16

## 2024-12-06 RX ORDER — RISPERIDONE 3 MG/1
3 TABLET ORAL NIGHTLY
COMMUNITY
Start: 2024-11-15

## 2024-12-06 RX ORDER — DEXCHLORPHENIRAMINE MALEATE, DEXTROMETHORPHAN HBR, PHENYLEPHRINE HCL 1; 10; 5 MG/5ML; MG/5ML; MG/5ML
10 SYRUP ORAL
Qty: 240 ML | Refills: 1 | Status: SHIPPED | OUTPATIENT
Start: 2024-12-06

## 2024-12-06 NOTE — PROGRESS NOTES
Subjective     Patient ID: Dion Flores is a 64 y.o. male.    Chief Complaint: Sinus Problem    Requesting meds to the pharmacy    Sinus Problem  Associated symptoms include congestion and coughing. Pertinent negatives include no headaches or shortness of breath.     Review of Systems   Constitutional:  Negative for fatigue and fever.   HENT:  Positive for nasal congestion. Negative for dental problem.    Eyes:  Negative for discharge.   Respiratory:  Positive for cough. Negative for choking, chest tightness and shortness of breath.    Cardiovascular:  Negative for chest pain and leg swelling.   Gastrointestinal:  Negative for constipation, diarrhea, nausea and vomiting.   Genitourinary:  Negative for discharge and flank pain.   Musculoskeletal:  Negative for arthralgias and myalgias.   Allergic/Immunologic: Negative for environmental allergies.   Neurological:  Negative for headaches and memory loss.   Psychiatric/Behavioral:  Negative for behavioral problems and hallucinations.           Objective     Physical Exam  Vitals and nursing note reviewed.   Constitutional:       Appearance: Normal appearance. He is normal weight.   HENT:      Head: Normocephalic and atraumatic.      Right Ear: Tympanic membrane normal.      Left Ear: Tympanic membrane normal.      Nose: Congestion present.      Mouth/Throat:      Mouth: Mucous membranes are moist.   Eyes:      Extraocular Movements: Extraocular movements intact.      Conjunctiva/sclera: Conjunctivae normal.      Pupils: Pupils are equal, round, and reactive to light.   Cardiovascular:      Rate and Rhythm: Normal rate and regular rhythm.      Pulses: Normal pulses.   Pulmonary:      Effort: Pulmonary effort is normal.      Breath sounds: Normal breath sounds.      Comments: Clear to auscultation with cough  Abdominal:      General: Abdomen is flat. Bowel sounds are normal.      Palpations: Abdomen is soft.   Musculoskeletal:         General: Normal range of motion.       Cervical back: Normal range of motion and neck supple.   Skin:     General: Skin is warm and dry.   Neurological:      General: No focal deficit present.      Mental Status: He is alert and oriented to person, place, and time.   Psychiatric:         Mood and Affect: Mood normal.            Assessment and Plan     1. Upper respiratory tract infection, unspecified type  -     cefdinir (OMNICEF) 300 MG capsule; Take 1 capsule (300 mg total) by mouth 2 (two) times daily. for 10 days  Dispense: 20 capsule; Refill: 0  -     dexchlorphen-phenylephrine-DM (POLYTUSSIN DM,DEXCHLORPHENRMN,) 1-5-10 mg/5 mL Syrp; Take 10 mLs by mouth every 6 (six) hours while awake.  Dispense: 240 mL; Refill: 1        RTC as needed         No follow-ups on file.

## 2024-12-14 ENCOUNTER — HOSPITAL ENCOUNTER (EMERGENCY)
Facility: HOSPITAL | Age: 64
Discharge: LEFT BEFORE DISCHARGE | End: 2024-12-14
Payer: MEDICAID

## 2024-12-14 VITALS
HEIGHT: 69 IN | HEART RATE: 104 BPM | SYSTOLIC BLOOD PRESSURE: 148 MMHG | OXYGEN SATURATION: 99 % | BODY MASS INDEX: 23.13 KG/M2 | DIASTOLIC BLOOD PRESSURE: 80 MMHG | WEIGHT: 156.19 LBS | RESPIRATION RATE: 20 BRPM | TEMPERATURE: 98 F

## 2024-12-14 PROCEDURE — 99499 UNLISTED E&M SERVICE: CPT | Mod: ,,, | Performed by: INTERNAL MEDICINE
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
## 2024-12-18 ENCOUNTER — PATIENT OUTREACH (OUTPATIENT)
Dept: EMERGENCY MEDICINE | Facility: HOSPITAL | Age: 64
End: 2024-12-18
Payer: MEDICAID

## 2024-12-18 NOTE — PROGRESS NOTES
12/18/24 spoke with patient sister Niharika who is listed as point of contact. Patient sister stated that the patient did not have a phone to be reached by and she had not seen him today would go by and check on him and services was declined at this time for enrollment. Noted to chart and stated understanding.  Chart will be closed.  Jazzy Rai Lpn-ED Navigator   # [phone]

## 2024-12-31 ENCOUNTER — HOSPITAL ENCOUNTER (EMERGENCY)
Facility: HOSPITAL | Age: 64
Discharge: LEFT BEFORE DISCHARGE | End: 2024-12-31
Attending: SPECIALIST
Payer: MEDICAID

## 2024-12-31 VITALS
DIASTOLIC BLOOD PRESSURE: 95 MMHG | OXYGEN SATURATION: 100 % | WEIGHT: 159.19 LBS | TEMPERATURE: 98 F | BODY MASS INDEX: 23.58 KG/M2 | SYSTOLIC BLOOD PRESSURE: 160 MMHG | RESPIRATION RATE: 18 BRPM | HEIGHT: 69 IN | HEART RATE: 87 BPM

## 2024-12-31 DIAGNOSIS — I10 HYPERTENSION, UNSPECIFIED TYPE: Primary | ICD-10-CM

## 2024-12-31 DIAGNOSIS — R07.9 CHEST PAIN: ICD-10-CM

## 2024-12-31 PROCEDURE — 93010 ELECTROCARDIOGRAM REPORT: CPT | Mod: ,,, | Performed by: INTERNAL MEDICINE

## 2024-12-31 PROCEDURE — 99284 EMERGENCY DEPT VISIT MOD MDM: CPT | Mod: 25

## 2024-12-31 PROCEDURE — 99283 EMERGENCY DEPT VISIT LOW MDM: CPT | Mod: ,,, | Performed by: SPECIALIST

## 2024-12-31 PROCEDURE — 93005 ELECTROCARDIOGRAM TRACING: CPT

## 2024-12-31 PROCEDURE — 25000003 PHARM REV CODE 250: Performed by: SPECIALIST

## 2024-12-31 RX ORDER — ASPIRIN 325 MG
325 TABLET ORAL
Status: COMPLETED | OUTPATIENT
Start: 2024-12-31 | End: 2024-12-31

## 2024-12-31 RX ADMIN — ASPIRIN 325 MG ORAL TABLET 325 MG: 325 PILL ORAL at 01:12
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
## 2024-12-31 NOTE — ED TRIAGE NOTES
"PRESENTS WITH C/O MID STERNAL CHEST PAIN NON RADIATING. ONSET AT 0000, "WHEN I WOKE UP." STATES HE WAS SLEEPING ON THE FLOOR AND WHEN HE WOKE UP HE WAS EXPERIENCING CHEST PAIN.  "

## 2025-01-09 LAB
OHS QRS DURATION: 88 MS
OHS QTC CALCULATION: 397 MS